# Patient Record
Sex: FEMALE | Race: WHITE | NOT HISPANIC OR LATINO | Employment: OTHER | URBAN - METROPOLITAN AREA
[De-identification: names, ages, dates, MRNs, and addresses within clinical notes are randomized per-mention and may not be internally consistent; named-entity substitution may affect disease eponyms.]

---

## 2017-10-02 ENCOUNTER — OFFICE VISIT (OUTPATIENT)
Dept: FAMILY MEDICINE | Facility: CLINIC | Age: 62
End: 2017-10-02
Payer: COMMERCIAL

## 2017-10-02 VITALS
DIASTOLIC BLOOD PRESSURE: 68 MMHG | HEIGHT: 66 IN | SYSTOLIC BLOOD PRESSURE: 118 MMHG | HEART RATE: 84 BPM | TEMPERATURE: 99 F | WEIGHT: 165.25 LBS | BODY MASS INDEX: 26.56 KG/M2

## 2017-10-02 DIAGNOSIS — S29.011A PECTORALIS MUSCLE STRAIN, INITIAL ENCOUNTER: Primary | ICD-10-CM
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
## 2017-10-02 DIAGNOSIS — R07.81 RIB PAIN ON RIGHT SIDE: ICD-10-CM

## 2017-10-02 PROCEDURE — 99213 OFFICE O/P EST LOW 20 MIN: CPT | Performed by: FAMILY MEDICINE

## 2017-10-02 RX ORDER — POLYETHYLENE GLYCOL 3350 17 G/17G
POWDER, FOR SOLUTION ORAL DAILY
COMMUNITY
End: 2024-02-12

## 2017-10-02 ASSESSMENT — PAIN SCALES - GENERAL: PAINLEVEL: MILD PAIN (3)

## 2017-10-02 NOTE — PROGRESS NOTES
SUBJECTIVE:   Deya Lee is a 62 year old female who presents to clinic today for the following health issues:      Concern - right side rib pain    Onset: 1 week ago    Description:   Right side rib pain due to a fall she had and landed on right elbow/arm that jammed into her ribs    Intensity: moderate; has worsened over the past few days     Progression of Symptoms:  same and constant    Accompanying Signs & Symptoms:  Denies any discoloration or swelling that she can see     Previous history of similar problem:   none    Precipitating factors:   Worsened by: laying down on her back is the worst; breathing deeply worsens the pain    Alleviating factors:  Improved by:     Therapies Tried and outcome: biofreeze, ibuprofen    Patient states her ribs do not hurt if she pushes on them.  She states she had an accident 20 years ago and had a hematoma, so she wonders if this is something similar.     Problem list and histories reviewed & adjusted, as indicated.  Additional history: as documented    BP Readings from Last 3 Encounters:   10/02/17 118/68   08/30/16 124/68   10/04/12 118/70    Wt Readings from Last 3 Encounters:   10/02/17 165 lb 4 oz (75 kg)   08/30/16 164 lb 6 oz (74.6 kg)   10/04/12 151 lb (68.5 kg)                  Reviewed and updated as needed this visit by clinical staff  Tobacco  Allergies  Med Hx  Surg Hx  Fam Hx  Soc Hx      Reviewed and updated as needed this visit by Provider         ROS:  Constitutional, HEENT, cardiovascular, pulmonary, GI, , neuro, skin, endocrine and psych systems are negative, except as otherwise noted.  Musculoskeletal: see above     This document serves as a record of the services and decisions personally performed and made by Chelo Carney DO. It was created on her/his behalf by Radha Manuel, a trained medical scribe. The creation of this document is based on the provider's statements to the medical scribe.  Radha Manuel October 2, 2017 11:41 AM  "      OBJECTIVE:   /68 (BP Location: Right arm, Cuff Size: Adult Large)  Pulse 84  Temp 99  F (37.2  C) (Oral)  Ht 5' 6\" (1.676 m)  Wt 165 lb 4 oz (75 kg)  BMI 26.67 kg/m2  Body mass index is 26.67 kg/(m^2).  GENERAL: healthy, alert and no distress  EYES: Eyes grossly normal to inspection, PERRL and conjunctivae and sclerae normal  HENT: ear canals and TM's normal, nose and mouth without ulcers or lesions  NECK: no adenopathy, no asymmetry, masses, or scars and thyroid normal to palpation  RESP: lungs clear to auscultation - no rales, rhonchi or wheezes  CV: regular rate and rhythm, normal S1 S2, no S3 or S4, no murmur, click or rub, no peripheral edema and peripheral pulses strong  MS: no gross musculoskeletal defects noted, no edema; pain in side with extension of shoulders, non tender through rib heads on right in posterior; pain in pec with palpation of sternum   SKIN: no suspicious lesions or rashes  NEURO: Normal strength and tone, mentation intact and speech normal  PSYCH: mentation appears normal, affect normal/bright    Diagnostic Test Results:  No results found for this or any previous visit (from the past 24 hour(s)).    ASSESSMENT/PLAN:       ICD-10-CM    1. Pectoralis muscle strain, initial encounter S29.011A    2. Rib pain on right side R07.81    3. Fall, initial encounter W19.XXXA         I recommended serratus anterior and pec stretches for patient.  Patient declines muscle relaxants.  She has no signs or symptoms of a fracture, so no x-ray was needed today.       Patient Instructions     Stretches given     This document serves as a record of the services and decisions personally performed and made by Chelo Carney DO. It was created on her/his behalf by Radha Manuel, a trained medical scribe. The creation of this document is based on the provider's statements to the medical scribe.  Radha Manuel October 2, 2017 11:41 AM     Chelo Carney DO  [facility]  "

## 2017-10-02 NOTE — MR AVS SNAPSHOT
"              After Visit Summary   10/2/2017    Deya Lee    MRN: [id number]           Patient Information     Date Of Birth          1955        Visit Information        Provider Department      10/2/2017 11:00 AM Chelo Carney DO [facility]        Today's Diagnoses     Pectoralis muscle strain, initial encounter    -  1    Rib pain on right side        Fall, initial encounter          Care Instructions    Here is a great basic stretch for Pectoralis Major    All stretches should be held for a minimum of 30 seconds. Repeat on both sides.  Stand next to a wall, extending your arm along it.   Rotate your body away.    Pec Major - Wall Stretching Position 1      Do serratus anterior muscle stretches          Follow-ups after your visit        Who to contact     If you have questions or need follow up information about today's clinic visit or your schedule please contact [facility] directly at [phone].  Normal or non-critical lab and imaging results will be communicated to you by MyChart, letter or phone within 4 business days after the clinic has received the results. If you do not hear from us within 7 days, please contact the clinic through "[employer]hart or phone. If you have a critical or abnormal lab result, we will notify you by phone as soon as possible.  Submit refill requests through [employer] or call your pharmacy and they will forward the refill request to us. Please allow 3 business days for your refill to be completed.          Additional Information About Your Visit        MyChart Information     [employer] lets you send messages to your doctor, view your test results, renew your prescriptions, schedule appointments and more. To sign up, go to [email or web address] . Click on \"Log in\" on the left side of the screen, which will take you to the Welcome page. Then click on \"Sign up Now\" on the right side of the page.     You will be asked to enter the access " "code listed below, as well as some personal information. Please follow the directions to create your username and password.     Your access code is: [email or web address]  Expires: 2017 12:00 PM     Your access code will  in 90 days. If you need help or a new code, please call your [place] clinic or [phone].        Care EveryWhere ID     This is your Care EveryWhere ID. This could be used by other organizations to access your [place] medical records  [id number]        Your Vitals Were     Pulse Temperature Height BMI (Body Mass Index)          84 99  F (37.2  C) (Oral) 5' 6\" (1.676 m) 26.67 kg/m2         Blood Pressure from Last 3 Encounters:   10/02/17 118/68   16 124/68   10/04/12 118/70    Weight from Last 3 Encounters:   10/02/17 165 lb 4 oz (75 kg)   16 164 lb 6 oz (74.6 kg)   10/04/12 151 lb (68.5 kg)              Today, you had the following     No orders found for display         Today's Medication Changes          These changes are accurate as of: 10/2/17 12:00 PM.  If you have any questions, ask your nurse or doctor.               Stop taking these medicines if you haven't already. Please contact your care team if you have questions.     PRILOSEC PO   Stopped by:  Chelo Carney,                     Primary Care Provider    None Specified       No primary provider on file.        Equal Access to Services     [facility]: Hadii eunice wetzelo Mohsen, waaxda lupatriciaadaha, qaybta kaalmada kaylen houser . So [facility] [phone].    ATENCIÓN: Si habla español, tiene a garcia disposición servicios gratuitos de asistencia lingüística. Llame al [phone].    We comply with applicable federal civil rights laws and [place] laws. We do not discriminate on the basis of race, color, national origin, age, disability, sex, sexual orientation, or gender identity.            Thank you!     Thank you for choosing [facility]  for your care. Our " goal is always to provide you with excellent care. Hearing back from our patients is one way we can continue to improve our services. Please take a few minutes to complete the written survey that you may receive in the mail after your visit with us. Thank you!             Your Updated Medication List - Protect others around you: Learn how to safely use, store and throw away your medicines at www.disposemymeds.org.          This list is accurate as of: 10/2/17 12:00 PM.  Always use your most recent med list.                   Brand Name Dispense Instructions for use Diagnosis    MIRALAX powder   Generic drug:  polyethylene glycol      Take 1 capful by mouth daily 2 tsp        ZANTAC 150 MG tablet   Generic drug:  ranitidine      Take by mouth 2 times daily        ZYRTEC ALLERGY PO      Take  by mouth.

## 2017-10-02 NOTE — PATIENT INSTRUCTIONS
Here is a great basic stretch for Pectoralis Major    All stretches should be held for a minimum of 30 seconds. Repeat on both sides.  Stand next to a wall, extending your arm along it.   Rotate your body away.    Pec Major - Wall Stretching Position 1      Do serratus anterior muscle stretches

## 2017-10-02 NOTE — NURSING NOTE
"Chief Complaint   Patient presents with     Fall     Rib Pain       Initial There were no vitals taken for this visit. Estimated body mass index is 26.53 kg/(m^2) as calculated from the following:    Height as of 8/30/16: 5' 6\" (1.676 m).    Weight as of 8/30/16: 164 lb 6 oz (74.6 kg).  Medication Reconciliation: complete   Yojana Nicholas CMA      "

## 2018-01-10 ENCOUNTER — OFFICE VISIT (OUTPATIENT)
Dept: FAMILY MEDICINE | Facility: CLINIC | Age: 63
End: 2018-01-10
Payer: COMMERCIAL

## 2018-01-10 VITALS
HEIGHT: 67 IN | WEIGHT: 166 LBS | SYSTOLIC BLOOD PRESSURE: 112 MMHG | TEMPERATURE: 97.4 F | BODY MASS INDEX: 26.06 KG/M2 | DIASTOLIC BLOOD PRESSURE: 68 MMHG | OXYGEN SATURATION: 100 % | HEART RATE: 99 BPM

## 2018-01-10 DIAGNOSIS — J01.90 ACUTE SINUSITIS WITH SYMPTOMS > 10 DAYS: ICD-10-CM

## 2018-01-10 DIAGNOSIS — R05.9 COUGH: Primary | ICD-10-CM

## 2018-01-10 DIAGNOSIS — Z12.11 COLON CANCER SCREENING: ICD-10-CM

## 2018-01-10 PROCEDURE — 99213 OFFICE O/P EST LOW 20 MIN: CPT | Performed by: FAMILY MEDICINE

## 2018-01-10 NOTE — PATIENT INSTRUCTIONS
[facility]    If you have any questions regarding to your visit please contact your care team:       Team Purple:   Clinic Hours Telephone Number   Dr. Bushra Kern   7am-7pm  Monday - Thursday   7am-5pm  Fridays  [phone]  (Appointment scheduling available 24/7)    Questions about your Visit?   Team Line:  [phone]   Urgent Care - [place] and [facility] - 11am-9pm Monday-Friday Saturday-Sunday- 9am-5pm   [place] - 5pm-9pm Monday-Friday Saturday-Sunday- 9am-5pm  [phone] - [facility]  [phone] - [place]       What options do I have for visits at the clinic other than the traditional office visit?  To expand how we care for you, many of our providers are utilizing electronic visits (e-visits) and telephone visits, when medically appropriate, for interactions with their patients rather than a visit in the clinic.   We also offer nurse visits for many medical concerns. Just like any other service, we will bill your insurance company for this type of visit based on time spent on the phone with your provider. Not all insurance companies cover these visits. Please check with your medical insurance if this type of visit is covered. You will be responsible for any charges that are not paid by your insurance.      E-visits via [employer]:  generally incur a $35.00 fee.  Telephone visits:  Time spent on the phone: *charged based on time that is spent on the phone in increments of 10 minutes. Estimated cost:   5-10 mins $30.00   11-20 mins. $59.00   21-30 mins. $85.00     Use [employer]hart (secure email communication and access to your chart) to send your primary care provider a message or make an appointment. Ask someone on your Team how to sign up for [employer].  For a Price Quote for your services, please call our Consumer Price Line at [phone].  As always, Thank you for trusting us with your health care  needs!    Analilia ROSARIO MA

## 2018-01-10 NOTE — NURSING NOTE
"Chief Complaint   Patient presents with     Sinus Problem     x 10 days     Health Maintenance     ADP, Flu shot, FIT, Hep C Screening        Initial /68  Pulse 99  Temp 97.4  F (36.3  C) (Oral)  Ht 5' 6.93\" (1.7 m)  Wt 166 lb (75.3 kg)  SpO2 100%  BMI 26.05 kg/m2 Estimated body mass index is 26.05 kg/(m^2) as calculated from the following:    Height as of this encounter: 5' 6.93\" (1.7 m).    Weight as of this encounter: 166 lb (75.3 kg).  Medication Reconciliation: complete   Analilia ROSARIO MA      "

## 2018-01-10 NOTE — MR AVS SNAPSHOT
After Visit Summary   1/10/2018    Deya Lee    MRN: [id number]           Patient Information     Date Of Birth          1955        Visit Information        Provider Department      1/10/2018 2:40 PM Rodger Richards MD [facility]        Today's Diagnoses     Cough    -  1    Acute sinusitis with symptoms > 10 days        Colon cancer screening          Care Instructions    [facility]    If you have any questions regarding to your visit please contact your care team:       Team Purple:   Clinic Hours Telephone Number   Dr. Bushra Kern   7am-7pm  Monday - Thursday   7am-5pm  Fridays  [phone]  (Appointment scheduling available 24/7)    Questions about your Visit?   Team Line:  [phone]   Urgent Care - [place] and [facility] - 11am-9pm Monday-Friday Saturday-Sunday- 9am-5pm   [place] - 5pm-9pm Monday-Friday Saturday-Sunday- 9am-5pm  [phone] - [facility]  [phone] - [employer]       What options do I have for visits at the clinic other than the traditional office visit?  To expand how we care for you, many of our providers are utilizing electronic visits (e-visits) and telephone visits, when medically appropriate, for interactions with their patients rather than a visit in the clinic.   We also offer nurse visits for many medical concerns. Just like any other service, we will bill your insurance company for this type of visit based on time spent on the phone with your provider. Not all insurance companies cover these visits. Please check with your medical insurance if this type of visit is covered. You will be responsible for any charges that are not paid by your insurance.      E-visits via [employer]:  generally incur a $35.00 fee.  Telephone visits:  Time spent on the phone: *charged based on time that is spent on the phone in increments of 10 minutes.  "Estimated cost:   5-10 mins $30.00   11-20 mins. $59.00   21-30 mins. $85.00     Use [employer]hart (secure email communication and access to your chart) to send your primary care provider a message or make an appointment. Ask someone on your Team how to sign up for [employer].  For a Price Quote for your services, please call our [employer] Line at [phone].  As always, Thank you for trusting us with your health care needs!    Analilia ROSARIO MA            Follow-ups after your visit        Future tests that were ordered for you today     Open Future Orders        Priority Expected Expires Ordered    Fecal colorectal cancer screen (FIT) Routine 1/31/2018 4/4/2018 1/10/2018            Who to contact     If you have questions or need follow up information about today's clinic visit or your schedule please contact [facility] directly at [phone].  Normal or non-critical lab and imaging results will be communicated to you by [employer], letter or phone within 4 business days after the clinic has received the results. If you do not hear from us within 7 days, please contact the clinic through [employer] or phone. If you have a critical or abnormal lab result, we will notify you by phone as soon as possible.  Submit refill requests through [employer] or call your pharmacy and they will forward the refill request to us. Please allow 3 business days for your refill to be completed.          Additional Information About Your Visit        Off[employer]atic Information     [employer] lets you send messages to your doctor, view your test results, renew your prescriptions, schedule appointments and more. To sign up, go to [email or web address] . Click on \"Log in\" on the left side of the screen, which will take you to the Welcome page. Then click on \"Sign up Now\" on the right side of the page.     You will be asked to enter the access code listed below, as well as some personal information. Please follow the directions to create your " "username and password.     Your access code is: [email or web address]  Expires: 4/10/2018  3:05 PM     Your access code will  in 90 days. If you need help or a new code, please call your [place] clinic or [phone].        Care EveryWhere ID     This is your Care EveryWhere ID. This could be used by other organizations to access your [place] medical records  [id number]        Your Vitals Were     Pulse Temperature Height Pulse Oximetry BMI (Body Mass Index)       99 97.4  F (36.3  C) (Oral) 5' 6.93\" (1.7 m) 100% 26.05 kg/m2        Blood Pressure from Last 3 Encounters:   01/10/18 112/68   10/02/17 118/68   16 124/68    Weight from Last 3 Encounters:   01/10/18 166 lb (75.3 kg)   10/02/17 165 lb 4 oz (75 kg)   16 164 lb 6 oz (74.6 kg)                 Today's Medication Changes          These changes are accurate as of: 1/10/18  3:05 PM.  If you have any questions, ask your nurse or doctor.               Start taking these medicines.        Dose/Directions    amoxicillin-clavulanate 875-125 MG per tablet   Commonly known as:  AUGMENTIN   Used for:  Acute sinusitis with symptoms > 10 days   Started by:  Rodger Richards MD        Dose:  1 tablet   Take 1 tablet by mouth 2 times daily   Quantity:  20 tablet   Refills:  0            Where to get your medicines      These medications were sent to [facility]  [address] [place] [address]  [address] [facility] [address], [place] [address]     Phone:  [phone]     amoxicillin-clavulanate 875-125 MG per tablet                Primary Care Provider Fax #    Physician No Ref-Primary [phone]       No address on file        Equal Access to Services     [facility]: Tanmay Connolly, waaxda luqadaha, qaybta kaalmada corrina, kaylen david [facility] [phone].    ATENCIÓN: Si habla español, tiene a garcia disposición servicios gratuitos de asistencia lingüística. Llame al " [phone].    We comply with applicable federal civil rights laws and [place] laws. We do not discriminate on the basis of race, color, national origin, age, disability, sex, sexual orientation, or gender identity.            Thank you!     Thank you for choosing [facility]  for your care. Our goal is always to provide you with excellent care. Hearing back from our patients is one way we can continue to improve our services. Please take a few minutes to complete the written survey that you may receive in the mail after your visit with us. Thank you!             Your Updated Medication List - Protect others around you: Learn how to safely use, store and throw away your medicines at www.disposemymeds.org.          This list is accurate as of: 1/10/18  3:05 PM.  Always use your most recent med list.                   Brand Name Dispense Instructions for use Diagnosis    amoxicillin-clavulanate 875-125 MG per tablet    AUGMENTIN    20 tablet    Take 1 tablet by mouth 2 times daily    Acute sinusitis with symptoms > 10 days       MIRALAX powder   Generic drug:  polyethylene glycol      Take 1 capful by mouth daily 2 tsp        ZANTAC 150 MG tablet   Generic drug:  ranitidine      Take by mouth 2 times daily        ZYRTEC ALLERGY PO      Take  by mouth.

## 2018-01-10 NOTE — PROGRESS NOTES
"  SUBJECTIVE:   Deya Lee is a 62 year old female who presents to clinic today for the following health issues:    RESPIRATORY SYMPTOMS      Duration: 10 days     Description  nasal congestion, sore throat, facial pain/pressure, cough, headache, fatigue/malaise and hoarse voice    Severity: high moderate    Accompanying signs and symptoms: None    History (predisposing factors):  Sinus infections     Precipitating or alleviating factors: ibuprofen helped a little bit   Therapies tried and outcome:  Ibuprofen    Cough, facial pressure, no dental pain  PROBLEMS TO ADD ON...    Problem list and histories reviewed & adjusted, as indicated.  Additional history: as documented    Patient Active Problem List   Diagnosis     Advanced directives, counseling/discussion     Seasonal allergies     Personal history of kidney stones     Primary osteoarthritis of both knees     Past Surgical History:   Procedure Laterality Date     BUNIONECTOMY      right foot     C/SECTION, CLASSICAL      2 times     FUSION CERVICAL ANTERIOR ONE LEVEL      c4-5     SINUS SURGERY      deviated septum correction     TONSILLECTOMY & ADENOIDECTOMY         Social History   Substance Use Topics     Smoking status: Never Smoker     Smokeless tobacco: Never Used     Alcohol use No     Family History   Problem Relation Age of Onset     HEART DISEASE Father      MI in his 70's     CANCER Father      had kidney removed due to spot      DIABETES Paternal Grandmother      DIABETES Paternal Grandfather      Alzheimer Disease Sister 52     C.A.D. No family hx of      Breast Cancer No family hx of      Colon Cancer No family hx of          Reviewed and updated as needed this visit by clinical staff    ROS:  Constitutional, cardiovascular, gi and gu systems are negative, except as otherwise noted.    OBJECTIVE:   /68  Pulse 99  Temp 97.4  F (36.3  C) (Oral)  Ht 5' 6.93\" (1.7 m)  Wt 166 lb (75.3 kg)  SpO2 100%  BMI 26.05 kg/m2  Body mass index " is 26.05 kg/(m^2).  GENERAL: healthy, alert and no distress  HENT: Nasal congestion, maxillary sinus tenderness bilaterally  NECK: no adenopathy and thyroid normal to palpation  RESP: lungs clear to auscultation - no rales, rhonchi or wheezes  CV: regular rate and rhythm, no murmur, click or rub, no peripheral edema   MS: no gross musculoskeletal defects noted, no edema    Diagnostic Test Results:  none     ASSESSMENT/PLAN:     (R05) Cough  (primary encounter diagnosis)  Comment: Viral, PND from sinus infection  Plan: Symptomatic treatment    (J01.90) Acute sinusitis with symptoms > 10 days  Comment: Discussed the nature and pathophysiology of sinusitis and treatment including the role of antibiotics and the need to get over the underlying trigger, URI or allergies.  Plan: amoxicillin-clavulanate (AUGMENTIN) 875-125 MG         per tablet    (Z12.11) Colon cancer screening  Comment: Would like to recheck with FIT  Plan: Fecal colorectal cancer screen (FIT)    Call or return to clinic prn if these symptoms worsen or fail to improve as anticipated in 2 weeks.    Rodger Richards MD  [facility]

## 2018-02-22 ENCOUNTER — OFFICE VISIT (OUTPATIENT)
Dept: FAMILY MEDICINE | Facility: CLINIC | Age: 63
End: 2018-02-22
Payer: COMMERCIAL

## 2018-02-22 VITALS
WEIGHT: 162 LBS | DIASTOLIC BLOOD PRESSURE: 68 MMHG | HEIGHT: 67 IN | TEMPERATURE: 98.6 F | BODY MASS INDEX: 25.43 KG/M2 | HEART RATE: 68 BPM | SYSTOLIC BLOOD PRESSURE: 114 MMHG

## 2018-02-22 DIAGNOSIS — M67.431 GANGLION CYST OF WRIST, RIGHT: Primary | ICD-10-CM

## 2018-02-22 DIAGNOSIS — Z12.11 SCREEN FOR COLON CANCER: ICD-10-CM

## 2018-02-22 PROCEDURE — 99213 OFFICE O/P EST LOW 20 MIN: CPT | Performed by: FAMILY MEDICINE

## 2018-02-22 NOTE — MR AVS SNAPSHOT
After Visit Summary   2/22/2018    Deya Lee    MRN: [id number]           Patient Information     Date Of Birth          1955        Visit Information        Provider Department      2/22/2018 10:00 AM Rosa Pena MD [facility]        Today's Diagnoses     Ganglion cyst of wrist, right    -  1    Screen for colon cancer          Care Instructions      Ganglion Cyst: Hand    A ganglion cyst is a firm, fluid-filled lump that can suddenly appear on the front or back of the wrist or at the base of a finger. These cysts grow from normal tissue in the wrist and fingers, and range in size from a pea to a peach pit. Although ganglion cysts are common, they don t spread, and they don t become cancerous. They can occur after an injury, but many times it isn t known why they grow. Ganglion cysts can change in size, and may go away on their own.  Symptoms  A ganglion cyst is sometimes painful, especially when it first occurs. Constantly using your hand or wrist can make the cyst enlarge and hurt more. Some hand and wrist movements, such as grasping things, may also be difficult.  How a ganglion cyst develops  Your wrist and hand are made up of many small bones that meet at joints. Tendons attach muscles to the bones at the joints. The tendons allow the joints to bend and straighten. Both tendons and joints are lined with tissue called synovium. This tissue makes a thick fluid that keeps the joints and tendons moving easily. Sometimes the tissue balloons out from the joint or tendons and forms a cyst. As the cyst fills with fluid and grows, it appears as a lump you can feel.  Where ganglion cysts occur  A ganglion cyst can occur anywhere on the hand near a joint. Cysts most commonly appear on the back or palm side of the wrist, or on the palm at the base of a finger. Your doctor can usually diagnose a cyst by examining the lump. He or she may draw off a little fluid or  order an X-ray to rule out other problems.  Treating a ganglion cyst  Your healthcare provider may just watch your ganglion cyst. Many shrink and become painless without treatment. Some disappear altogether. If the cyst is unsightly or painful, or makes it hard for you to use your hand, your healthcare provider can treat it or, if needed, remove it surgically.  Nonsurgical treatment  To shrink the cyst, your provider may remove (aspirate) the fluid with a needle. If the cyst hurts, your provider may also give you an injection of an anti-inflammatory, such as cortisone, to relieve the irritation. Your hand may then be wrapped to help keep the cyst from recurring.  Surgery  If the cyst reappears after treatment, your healthcare provider may remove it surgically. A section of the tissue that lines the joint or tendon is removed along with the cyst. This helps prevent another cyst from forming, although recurrence of the cyst is still possible after surgery. Usually, only your hand or arm is numbed, and you can go home a few hours after surgery. Your hand may be in a splint for several days.  Date Last Reviewed: 9/10/2015    1492-7272 [employer]. [address] [place] [address]. All rights reserved. This information is not intended as a substitute for professional medical care. Always follow your healthcare professional's instructions.      [facility]   Discharged by : Ana Vogt MA    Paper scripts provided to patient : no     If you have any questions regarding your visit please contact your care team:     Team [facility] Hours Telephone Number     Dr. Kimberly Pena 7am-7pm  Monday - Thursday   7am-5pm  Fridays  [phone]   (Appointment scheduling available 24/7)     RN Line  [phone] option 2     Urgent Care - [place] and [facility] - 11am-9pm  Monday-Friday Saturday-Sunday- 9am-5pm     [place] -   5pm-9pm Monday-Friday Saturday-Sunday- 9am-5pm    [phone] - [place]    [phone] - [place]       For a Price Quote for your services, please call our Consumer Price Line at [phone].     What options do I have for visits at the clinic other than the traditional office visit?     To expand how we care for you, many of our providers are utilizing electronic visits (e-visits) and telephone visits, when medically appropriate, for interactions with their patients rather than a visit in the clinic. We also offer nurse visits for many medical concerns. Just like any other service, we will bill your insurance company for this type of visit based on time spent on the phone with your provider. Not all insurance companies cover these visits. Please check with your medical insurance if this type of visit is covered. You will be responsible for any charges that are not paid by your insurance.   E-visits via [employer]: generally incur a $35.00 fee.     Telephone visits:   Time spent on the phone: *charged based on time that is spent on the phone in increments of 10 minutes. Estimated cost:   5-10 mins $30.00   11-20 mins. $59.00   21-30 mins. $85.00     Use Azumiot (secure email communication and access to your chart) to send your primary care provider a message or make an appointment. Ask someone on your Team how to sign up for [employer].     As always, Thank you for trusting us with your health care needs!      [facility]:    Scheduling Appointments  [facility]  Call: [phone]    [place] [facility] [facility]  Call: [phone]    [facility]  Call: [phone]    For Gastroenterology referrals   [facility]   Clinics and Surgery [place], 4th Floor   [address] [place] [address]   Appointments: [phone]    WHERE TO GO FOR CARE?  Clinic    Make  an appointment if you:       Are sick (cold, cough, flu, sore throat, earache or in pain).       Have a small injury (sprain, small cut, burn or broken bone).       Need a physical exam, Pap smear, vaccine or prescription refill.       Have questions about your health or medicines.    To reach us:      Call [phone] ([phone]). Open 24 hours every day. (For counseling services, call [phone].)    Log into Follicum at Jipio. (Visit GLSS to create an account.) Hospital emergency room    An emergency is a serious or life- threatening problem that must be treated right away.    Call 434 or get to the hospital if you have:      Very bad or sudden:            - Chest pain or pressure         - Bleeding         - Head or belly pain         - Dizziness or trouble seeing, walking or                          Speaking      Problems breathing      Blood in your vomit or you are coughing up blood      A major injury (knocked out, loss of a finger or limb, rape, broken bone protruding from skin)    A mental health crisis. (Or call the Mental Health Crisis line at 1-253.786.9526 or Suicide Prevention Hotline at 1-793.139.2650.)    Open 24 hours every day. You don't need an appointment.     Urgent care    Visit urgent care for sickness or small injuries when the clinic is closed. You don't need an appointment. To check hours or find an urgent care near you, visit [email or web address]. Online care    Get online care from [facility] for more than 70 common problems, like colds, allergies and infections. Open 24 hours every day at:   [email or web address]   Need help deciding?    For advice about where to be seen, you may call your clinic and ask to speak with a nurse. We're here for you 24 hours every day.         If you are deaf or hard of hearing, please let us know. We provide many free services including sign language interpreters, oral interpreters, TTYs, telephone amplifiers, note takers and  "written materials.                   Follow-ups after your visit        Who to contact     If you have questions or need follow up information about today's clinic visit or your schedule please contact [facility] directly at [phone].  Normal or non-critical lab and imaging results will be communicated to you by MyChart, letter or phone within 4 business days after the clinic has received the results. If you do not hear from us within 7 days, please contact the clinic through MyChart or phone. If you have a critical or abnormal lab result, we will notify you by phone as soon as possible.  Submit refill requests through goBalto or call your pharmacy and they will forward the refill request to us. Please allow 3 business days for your refill to be completed.          Additional Information About Your Visit        [employer] Information     goBalto lets you send messages to your doctor, view your test results, renew your prescriptions, schedule appointments and more. To sign up, go to [email or web address] . Click on \"Log in\" on the left side of the screen, which will take you to the Welcome page. Then click on \"Sign up Now\" on the right side of the page.     You will be asked to enter the access code listed below, as well as some personal information. Please follow the directions to create your username and password.     Your access code is: [email or web address]  Expires: 4/10/2018  3:05 PM     Your access code will  in 90 days. If you need help or a new code, please call your [facility] clinic or [phone].        Care EveryWhere ID     This is your Care EveryWhere ID. This could be used by other organizations to access your [facility] medical records  [id number]        Your Vitals Were     Pulse Temperature Height BMI (Body Mass Index)          68 98.6  F (37  C) (Oral) 5' 6.93\" (1.7 m) 25.43 kg/m2         Blood Pressure from Last 3 Encounters:   18 114/68   01/10/18 112/68   10/02/17 " 118/68    Weight from Last 3 Encounters:   02/22/18 162 lb (73.5 kg)   01/10/18 166 lb (75.3 kg)   10/02/17 165 lb 4 oz (75 kg)              Today, you had the following     No orders found for display         Today's Medication Changes          These changes are accurate as of 2/22/18 10:41 AM.  If you have any questions, ask your nurse or doctor.               Stop taking these medicines if you haven't already. Please contact your care team if you have questions.     amoxicillin-clavulanate 875-125 MG per tablet   Commonly known as:  AUGMENTIN   Stopped by:  Rosa Pena MD                    Primary Care Provider Fax #    Physician No Ref-Primary [phone]       No address on file        Equal Access to Services     MANUELA NANCE : Tanmay Connolly, warigoberto luqshaka, qaybdior kaalmarobbi houser, kaylen mckenzie . So [facility] [phone].    ATENCIÓN: Si habla español, tiene a garcia disposición servicios gratuitos de asistencia lingüística. Llame al [phone].    We comply with applicable federal civil rights laws and [place] laws. We do not discriminate on the basis of race, color, national origin, age, disability, sex, sexual orientation, or gender identity.            Thank you!     Thank you for choosing [facility]  for your care. Our goal is always to provide you with excellent care. Hearing back from our patients is one way we can continue to improve our services. Please take a few minutes to complete the written survey that you may receive in the mail after your visit with us. Thank you!             Your Updated Medication List - Protect others around you: Learn how to safely use, store and throw away your medicines at www.disposemymeds.org.          This list is accurate as of 2/22/18 10:41 AM.  Always use your most recent med list.                   Brand Name Dispense Instructions for use Diagnosis    MIRALAX powder   Generic drug:   polyethylene glycol      Take 1 capful by mouth daily 2 tsp        ZANTAC 150 MG tablet   Generic drug:  ranitidine      Take by mouth 2 times daily        ZYRTEC ALLERGY PO      Take  by mouth.

## 2018-02-22 NOTE — NURSING NOTE
"Chief Complaint   Patient presents with     Cyst     wrist, right       Initial /68  Pulse 68  Temp 98.6  F (37  C) (Oral)  Ht 5' 6.93\" (1.7 m)  Wt 162 lb (73.5 kg)  BMI 25.43 kg/m2 Estimated body mass index is 25.43 kg/(m^2) as calculated from the following:    Height as of this encounter: 5' 6.93\" (1.7 m).    Weight as of this encounter: 162 lb (73.5 kg).  Medication Reconciliation: complete   Ana Vogt MA      "

## 2018-02-22 NOTE — PATIENT INSTRUCTIONS
Ganglion Cyst: Hand    A ganglion cyst is a firm, fluid-filled lump that can suddenly appear on the front or back of the wrist or at the base of a finger. These cysts grow from normal tissue in the wrist and fingers, and range in size from a pea to a peach pit. Although ganglion cysts are common, they don t spread, and they don t become cancerous. They can occur after an injury, but many times it isn t known why they grow. Ganglion cysts can change in size, and may go away on their own.  Symptoms  A ganglion cyst is sometimes painful, especially when it first occurs. Constantly using your hand or wrist can make the cyst enlarge and hurt more. Some hand and wrist movements, such as grasping things, may also be difficult.  How a ganglion cyst develops  Your wrist and hand are made up of many small bones that meet at joints. Tendons attach muscles to the bones at the joints. The tendons allow the joints to bend and straighten. Both tendons and joints are lined with tissue called synovium. This tissue makes a thick fluid that keeps the joints and tendons moving easily. Sometimes the tissue balloons out from the joint or tendons and forms a cyst. As the cyst fills with fluid and grows, it appears as a lump you can feel.  Where ganglion cysts occur  A ganglion cyst can occur anywhere on the hand near a joint. Cysts most commonly appear on the back or palm side of the wrist, or on the palm at the base of a finger. Your doctor can usually diagnose a cyst by examining the lump. He or she may draw off a little fluid or order an X-ray to rule out other problems.  Treating a ganglion cyst  Your healthcare provider may just watch your ganglion cyst. Many shrink and become painless without treatment. Some disappear altogether. If the cyst is unsightly or painful, or makes it hard for you to use your hand, your healthcare provider can treat it or, if needed, remove it surgically.  Nonsurgical treatment  To shrink the cyst, your  provider may remove (aspirate) the fluid with a needle. If the cyst hurts, your provider may also give you an injection of an anti-inflammatory, such as cortisone, to relieve the irritation. Your hand may then be wrapped to help keep the cyst from recurring.  Surgery  If the cyst reappears after treatment, your healthcare provider may remove it surgically. A section of the tissue that lines the joint or tendon is removed along with the cyst. This helps prevent another cyst from forming, although recurrence of the cyst is still possible after surgery. Usually, only your hand or arm is numbed, and you can go home a few hours after surgery. Your hand may be in a splint for several days.  Date Last Reviewed: 9/10/2015    7770-9997 [employer]. [address] [place] [address], [place] [address]. All rights reserved. This information is not intended as a substitute for professional medical care. Always follow your healthcare professional's instructions.      [facility]   Discharged by : Ana Vogt MA    Paper scripts provided to patient : no     If you have any questions regarding your visit please contact your care team:     Team Gold                [facility] Hours Telephone Number     Dr. Kimberly Pena 7am-7pm  Monday - Thursday   7am-5pm  Fridays  [phone]   (Appointment scheduling available 24/7)     RN Line  [phone] option 2     Urgent Care - [place] - 11am-9pm Monday-Friday Saturday-Sunday- 9am-5pm     [place] -   5pm-9pm Monday-Friday Saturday-Sunday- 9am-5pm    [phone] - [place]    [phone] - [place]       For a Price Quote for your services, please call our Consumer Price Line at [phone].     What options do I have for visits at the clinic other than the traditional office visit?     To expand how we care for you, many of our providers  are utilizing electronic visits (e-visits) and telephone visits, when medically appropriate, for interactions with their patients rather than a visit in the clinic. We also offer nurse visits for many medical concerns. Just like any other service, we will bill your insurance company for this type of visit based on time spent on the phone with your provider. Not all insurance companies cover these visits. Please check with your medical insurance if this type of visit is covered. You will be responsible for any charges that are not paid by your insurance.   E-visits via [employer]: generally incur a $35.00 fee.     Telephone visits:   Time spent on the phone: *charged based on time that is spent on the phone in increments of 10 minutes. Estimated cost:   5-10 mins $30.00   11-20 mins. $59.00   21-30 mins. $85.00     Use Ule (secure email communication and access to your chart) to send your primary care provider a message or make an appointment. Ask someone on your Team how to sign up for Ule.     As always, Thank you for trusting us with your health care needs!      [facility]:    Scheduling Appointments  [facility]  Call: [phone]    [facility]  Call: [phone]    [facility]  Call: [phone]    For Gastroenterology referrals   [facility] Gastroenterology   Clinics and Surgery Center, 4th Floor   [address] [place] [address]   Appointments: [phone]    WHERE TO GO FOR CARE?  Clinic    Make an appointment if you:       Are sick (cold, cough, flu, sore throat, earache or in pain).       Have a small injury (sprain, small cut, burn or broken bone).       Need a physical exam, Pap smear, vaccine or prescription refill.       Have questions about your health or medicines.    To reach us:      Call [phone] ([phone]). Open 24 hours every day. (For counseling services, call  [phone].)    Log into [employer] at [email or web address]. (Visit [email or web address] to create an account.) Hospital emergency room    An emergency is a serious or life- threatening problem that must be treated right away.    Call 911 or get to the hospital if you have:      Very bad or sudden:            - Chest pain or pressure         - Bleeding         - Head or belly pain         - Dizziness or trouble seeing, walking or                          Speaking      Problems breathing      Blood in your vomit or you are coughing up blood      A major injury (knocked out, loss of a finger or limb, rape, broken bone protruding from skin)    A mental health crisis. (Or call the Mental Health Crisis line at 1-427.956.4439 or Suicide Prevention Hotline at 1-468.707.5967.)    Open 24 hours every day. You don't need an appointment.     Urgent care    Visit urgent care for sickness or small injuries when the clinic is closed. You don't need an appointment. To check hours or find an urgent care near you, visit [email or web address]. Online care    Get online care from [facility] for more than 70 common problems, like colds, allergies and infections. Open 24 hours every day at:   [email or web address]   Need help deciding?    For advice about where to be seen, you may call your clinic and ask to speak with a nurse. We're here for you 24 hours every day.         If you are deaf or hard of hearing, please let us know. We provide many free services including sign language interpreters, oral interpreters, TTYs, telephone amplifiers, note takers and written materials.

## 2018-02-22 NOTE — PROGRESS NOTES
SUBJECTIVE:   Deya Lee is a 62 year old female who presents to clinic today for the following health issues:       Patient here with cyst on right wrist.   Noticed a couple weeks ago. Sudden onset, gets sore at times.   No changes in size . No trauma. She plays piano.     Problem list and histories reviewed & adjusted, as indicated.  Additional history: as documented    Patient Active Problem List   Diagnosis     Advanced directives, counseling/discussion     Seasonal allergies     Personal history of kidney stones     Primary osteoarthritis of both knees     Past Surgical History:   Procedure Laterality Date     BUNIONECTOMY      right foot     C/SECTION, CLASSICAL      2 times     FUSION CERVICAL ANTERIOR ONE LEVEL      c4-5     SINUS SURGERY      deviated septum correction     TONSILLECTOMY & ADENOIDECTOMY         Social History   Substance Use Topics     Smoking status: Never Smoker     Smokeless tobacco: Never Used     Alcohol use No     Family History   Problem Relation Age of Onset     HEART DISEASE Father      MI in his 70's     CANCER Father      had kidney removed due to spot      DIABETES Paternal Grandmother      DIABETES Paternal Grandfather      Alzheimer Disease Sister 52     C.A.D. No family hx of      Breast Cancer No family hx of      Colon Cancer No family hx of          Current Outpatient Prescriptions   Medication Sig Dispense Refill     ranitidine (ZANTAC) 150 MG tablet Take by mouth 2 times daily       polyethylene glycol (MIRALAX) powder Take 1 capful by mouth daily 2 tsp       Cetirizine HCl (ZYRTEC ALLERGY PO) Take  by mouth.         Allergies   Allergen Reactions     Codeine Phosphate Nausea     Erythromycin Nausea     Recent Labs   Lab Test  08/30/16   0757  10/11/12   0932  07/11/12   0629   LDL  175*  184*   --    HDL  37*  34*   --    TRIG  144  120   --    CR   --    --   0.89   GFRESTIMATED   --    --   65   GFRESTBLACK   --    --   79   POTASSIUM   --    --   3.8   TSH    "--   0.50   --       BP Readings from Last 3 Encounters:   02/22/18 114/68   01/10/18 112/68   10/02/17 118/68    Wt Readings from Last 3 Encounters:   02/22/18 162 lb (73.5 kg)   01/10/18 166 lb (75.3 kg)   10/02/17 165 lb 4 oz (75 kg)                  Labs reviewed in EPIC    Reviewed and updated as needed this visit by clinical staff  Allergies       Reviewed and updated as needed this visit by Provider         ROS:  Constitutional, HEENT, cardiovascular, pulmonary, gi and gu systems are negative, except as otherwise noted.    OBJECTIVE:     /68  Pulse 68  Temp 98.6  F (37  C) (Oral)  Ht 5' 6.93\" (1.7 m)  Wt 162 lb (73.5 kg)  BMI 25.43 kg/m2  Body mass index is 25.43 kg/(m^2).  GENERAL: healthy, alert and no distress  Right wrist, approx 3 x 3 mm, cystic , nontender lump.    ASSESSMENT/PLAN:       ICD-10-CM    1. Ganglion cyst of wrist, right M67.431    2. Screen for colon cancer Z12.11      Benign nature of ganglion cyst discussed. Printed information provided to pt.     Pt has the FIT test kit, will submit the test soon.     Rosa Pena MD  [facility]    "

## 2018-02-28 PROCEDURE — 82274 ASSAY TEST FOR BLOOD FECAL: CPT | Performed by: FAMILY MEDICINE

## 2018-03-01 DIAGNOSIS — Z12.11 COLON CANCER SCREENING: ICD-10-CM

## 2018-03-01 LAB — HEMOCCULT STL QL IA: NEGATIVE

## 2018-06-28 ENCOUNTER — OFFICE VISIT (OUTPATIENT)
Dept: FAMILY MEDICINE | Facility: CLINIC | Age: 63
End: 2018-06-28
Payer: COMMERCIAL

## 2018-06-28 VITALS
BODY MASS INDEX: 25.74 KG/M2 | OXYGEN SATURATION: 99 % | HEART RATE: 97 BPM | DIASTOLIC BLOOD PRESSURE: 72 MMHG | TEMPERATURE: 98.9 F | SYSTOLIC BLOOD PRESSURE: 118 MMHG | WEIGHT: 164 LBS

## 2018-06-28 DIAGNOSIS — R00.0 TACHYCARDIA: Primary | ICD-10-CM

## 2018-06-28 DIAGNOSIS — M25.542 ARTHRALGIA OF BOTH HANDS: ICD-10-CM

## 2018-06-28 DIAGNOSIS — R00.0 RAPID HEARTBEAT: Primary | ICD-10-CM

## 2018-06-28 DIAGNOSIS — F43.9 STRESS: ICD-10-CM

## 2018-06-28 DIAGNOSIS — M25.541 ARTHRALGIA OF BOTH HANDS: ICD-10-CM

## 2018-06-28 LAB — ERYTHROCYTE [SEDIMENTATION RATE] IN BLOOD BY WESTERGREN METHOD: 16 MM/H (ref 0–30)

## 2018-06-28 PROCEDURE — 99214 OFFICE O/P EST MOD 30 MIN: CPT | Performed by: NURSE PRACTITIONER

## 2018-06-28 PROCEDURE — 86431 RHEUMATOID FACTOR QUANT: CPT | Performed by: NURSE PRACTITIONER

## 2018-06-28 PROCEDURE — 85652 RBC SED RATE AUTOMATED: CPT | Performed by: NURSE PRACTITIONER

## 2018-06-28 PROCEDURE — 93000 ELECTROCARDIOGRAM COMPLETE: CPT | Performed by: NURSE PRACTITIONER

## 2018-06-28 ASSESSMENT — PAIN SCALES - GENERAL: PAINLEVEL: NO PAIN (0)

## 2018-06-28 NOTE — PATIENT INSTRUCTIONS
If your develop symptoms along with pounding heart, or it does not resolve despite quitting job and reduction in stress, please let me know and I will order portable heart rate monitor for you

## 2018-06-28 NOTE — MR AVS SNAPSHOT
"              After Visit Summary   6/28/2018    Deya Lee    MRN: [id number]           Patient Information     Date Of Birth          1955        Visit Information        Provider Department      6/28/2018 10:20 AM Emily Escobar APRN CNP [facility]        Today's Diagnoses     Rapid heartbeat    -  1    Stress        Arthralgia of both hands          Care Instructions    If your develop symptoms along with pounding heart, or it does not resolve despite quitting job and reduction in stress, please let me know and I will order portable heart rate monitor for you          Follow-ups after your visit        Who to contact     If you have questions or need follow up information about today's clinic visit or your schedule please contact [facility] directly at [phone].  Normal or non-critical lab and imaging results will be communicated to you by MyChart, letter or phone within 4 business days after the clinic has received the results. If you do not hear from us within 7 days, please contact the clinic through MyChart or phone. If you have a critical or abnormal lab result, we will notify you by phone as soon as possible.  Submit refill requests through [employer] or call your pharmacy and they will forward the refill request to us. Please allow 3 business days for your refill to be completed.          Additional Information About Your Visit        MyChart Information     [employer] lets you send messages to your doctor, view your test results, renew your prescriptions, schedule appointments and more. To sign up, go to [email or web address] . Click on \"Log in\" on the left side of the screen, which will take you to the Welcome page. Then click on \"Sign up Now\" on the right side of the page.     You will be asked to enter the access code listed below, as well as some personal information. Please follow the directions to create your username and " password.     Your access code is: [email or web address]  Expires: 2018 10:51 AM     Your access code will  in 90 days. If you need help or a new code, please call your [place] [place] clinic or [phone].        Care EveryWhere ID     This is your Care EveryWhere ID. This could be used by other organizations to access your [facility] medical records  [id number]        Your Vitals Were     Pulse Temperature Pulse Oximetry Breastfeeding? BMI (Body Mass Index)       97 98.9  F (37.2  C) (Oral) 99% No 25.74 kg/m2        Blood Pressure from Last 3 Encounters:   18 118/72   18 114/68   01/10/18 112/68    Weight from Last 3 Encounters:   18 164 lb (74.4 kg)   18 162 lb (73.5 kg)   01/10/18 166 lb (75.3 kg)              We Performed the Following     Anaplasma phagocytoph antibody IgG IgM     EKG 12-lead, tracing only     Erythrocyte sedimentation rate auto     Rheumatoid factor          Today's Medication Changes          These changes are accurate as of 18 10:52 AM.  If you have any questions, ask your nurse or doctor.               Start taking these medicines.        Dose/Directions    diclofenac 1 % Gel topical gel   Commonly known as:  VOLTAREN   Used for:  Arthralgia of both hands   Started by:  Emily Escobar APRN CNP        Apply  2 grams to hands four times daily using enclosed dosing card.   Quantity:  100 g   Refills:  1            Where to get your medicines      These medications were sent to [id number] - [place], [place] - [address]  [address], [place] [address]     Phone:  [phone]     diclofenac 1 % Gel topical gel                Primary Care Provider Fax #    Physician No Ref-Primary [phone]       No address on file        Equal Access to Services     MANUELA NANCE AH: Hadii aad ku hadasho Soomaali, waaxda luqadaha, qaybta kaalmakaylen vasquez  ah. So [facility] [phone].    ATENCIÓN: Si avilala jacquelyn, tiene a garcia disposición servicios gratuitos de asistencia lingüística. Sonia al [phone].    We comply with applicable federal civil rights laws and [place] laws. We do not discriminate on the basis of race, color, national origin, age, disability, sex, sexual orientation, or gender identity.            Thank you!     Thank you for choosing [facility]  for your care. Our goal is always to provide you with excellent care. Hearing back from our patients is one way we can continue to improve our services. Please take a few minutes to complete the written survey that you may receive in the mail after your visit with us. Thank you!             Your Updated Medication List - Protect others around you: Learn how to safely use, store and throw away your medicines at www.disposemymeds.org.          This list is accurate as of 6/28/18 10:52 AM.  Always use your most recent med list.                   Brand Name Dispense Instructions for use Diagnosis    diclofenac 1 % Gel topical gel    VOLTAREN    100 g    Apply  2 grams to hands four times daily using enclosed dosing card.    Arthralgia of both hands       MIRALAX powder   Generic drug:  polyethylene glycol      Take 1 capful by mouth daily 2 tsp        ZANTAC 150 MG tablet   Generic drug:  ranitidine      Take by mouth 2 times daily        ZYRTEC ALLERGY PO      Take  by mouth.

## 2018-06-28 NOTE — PROGRESS NOTES
"  SUBJECTIVE:   Deya Lee is a 63 year old female who presents to clinic today for the following health issues:      Concern - Heart racing  Onset: 4 days    Description:   Feels like her heart is racing.    Intensity: mild    Progression of Symptoms: same    Accompanying Signs & Symptoms:  none    Previous history of similar problem:   no    Precipitating factors:   Worsened by: Stress, stressful issue at work    Alleviating factors:  Improved by:     Therapies Tried and outcome:     Sensation of \"pounding\" HR intermittently for 4 days  Occurs at work  She has been working in  room at  which is causing a lot of stress  Not worsened with exertion  Denies lightheadedness, nausea, pre-scncope    Also complains of bilateral hand pain  Has osteoarthritis knees  Father has RA  Driving painful with holding steering wheel  Painful all times of the day  Complains of swelling to joints  Took 600 mg ibuprofen without relief  Pain worse with humidity changes        Problem list and histories reviewed & adjusted, as indicated.  Additional history: none    Patient Active Problem List   Diagnosis     Advanced directives, counseling/discussion     Seasonal allergies     Personal history of kidney stones     Primary osteoarthritis of both knees     Past Surgical History:   Procedure Laterality Date     BUNIONECTOMY      right foot     C/SECTION, CLASSICAL      2 times     FUSION CERVICAL ANTERIOR ONE LEVEL      c4-5     SINUS SURGERY      deviated septum correction     TONSILLECTOMY & ADENOIDECTOMY         Social History   Substance Use Topics     Smoking status: Never Smoker     Smokeless tobacco: Never Used     Alcohol use No     Family History   Problem Relation Age of Onset     HEART DISEASE Father      MI in his 70's     Cancer Father      had kidney removed due to spot      Diabetes Paternal Grandmother      Diabetes Paternal Grandfather      Alzheimer Disease Sister 52     C.A.D. No family hx of      " "Breast Cancer No family hx of      Colon Cancer No family hx of            Reviewed and updated as needed this visit by clinical staff  Tobacco  Meds  Med Hx  Surg Hx  Fam Hx  Soc Hx      Reviewed and updated as needed this visit by Provider         ROS:  Constitutional, HEENT, cardiovascular, pulmonary, gi and gu systems are negative, except as otherwise noted.    OBJECTIVE:     /72 (BP Location: Right arm, Patient Position: Chair, Cuff Size: Adult Regular)  Pulse 97  Temp 98.9  F (37.2  C) (Oral)  Wt 164 lb (74.4 kg)  SpO2 99%  Breastfeeding? No  BMI 25.74 kg/m2  Body mass index is 25.74 kg/(m^2).  GENERAL: healthy, alert and no distress  RESP: lungs clear to auscultation - no rales, rhonchi or wheezes  CV: regular rate and rhythm, normal S1 S2, no S3 or S4, no murmur, click or rub, no peripheral edema and peripheral pulses strong  MS: Positive squeeze test bilaterally. No obvious joint inflammation (though patient reports this is swollen for her). Tenderness to all MCP and IP joints bilaterally  SKIN: no suspicious lesions or rashes  NEURO: Normal strength and tone, mentation intact and speech normal    Diagnostic Test Results:  ECG: SR with RBBB    ASSESSMENT/PLAN:   1. Rapid heartbeat  - Asymptomatic and ECG normal other than BBB which I don't think is contributing. I suspect \"pounding\" r/t stress as it only occurs while at work which produces anxiety for her. She is recently retired and started this job a few months ago. She plans to quit and will find something more enjoyable. I offered to do blood work today. She is overdue for annual exam. But she declines. We will monitor symptoms and if they don't resolve after employment change or she becomes symptomatic, will get Zio  - EKG 12-lead, tracing only    2. Stress  - see above    3. Arthralgia of both hands  - Will check labs given family history, though symptoms not consistent with RA. Would expect morning stiffness whereas she has pain " throughout the day. Discussed xrays though I don't think this will change our treatment plan. She also stated that if she has RA she is not interested in treatment. In the meantime, can treat with topical NSAID  - Rheumatoid factor  - Erythrocyte sedimentation rate auto  - Anaplasma phagocytoph antibody IgG IgM  - diclofenac (VOLTAREN) 1 % GEL topical gel; Apply  2 grams to hands four times daily using enclosed dosing card.  Dispense: 100 g; Refill: 1    Patient Instructions   If your develop symptoms along with pounding heart, or it does not resolve despite quitting job and reduction in stress, please let me know and I will order portable heart rate monitor for you      ALISA Coleman [facility]

## 2018-06-29 LAB
A PHAGOCYTOPH IGG TITR SER IF: NORMAL {TITER}
A PHAGOCYTOPH IGM TITR SER IF: NORMAL {TITER}
RHEUMATOID FACT SER NEPH-ACNC: <20 IU/ML (ref 0–20)

## 2018-07-02 NOTE — PROGRESS NOTES
[address] [address]  Phone: [phone]  Fax: [phone]      07/02/18    Deya Lee  [address]   [place] [address]      Dear Deya,    The results of your recent labs are enclosed.  Your labs results were within normal range which makes rheumatoid arthritis much less likely. Especially since your pain isn't a typical pattern of RA.   (Please ignore the test for Anaplasma. That was ordered in error and was cancelled prior to processing)  Please call the clinic if you have any concerns.    Sincerely,    ALISA Coleman CNP    Your [facility] Care Team

## 2018-09-25 ENCOUNTER — RADIANT APPOINTMENT (OUTPATIENT)
Dept: MAMMOGRAPHY | Facility: CLINIC | Age: 63
End: 2018-09-25
Payer: COMMERCIAL

## 2018-09-25 DIAGNOSIS — Z12.31 VISIT FOR SCREENING MAMMOGRAM: ICD-10-CM

## 2018-09-25 PROCEDURE — 77063 BREAST TOMOSYNTHESIS BI: CPT | Mod: TC

## 2018-09-25 PROCEDURE — 77067 SCR MAMMO BI INCL CAD: CPT | Mod: TC

## 2018-09-28 ENCOUNTER — RADIANT APPOINTMENT (OUTPATIENT)
Dept: MAMMOGRAPHY | Facility: CLINIC | Age: 63
End: 2018-09-28
Attending: INTERNAL MEDICINE
Payer: COMMERCIAL

## 2018-09-28 DIAGNOSIS — R92.8 ABNORMAL MAMMOGRAM: ICD-10-CM

## 2018-09-28 PROCEDURE — 77065 DX MAMMO INCL CAD UNI: CPT | Mod: RT | Performed by: RADIOLOGY

## 2019-03-12 ENCOUNTER — OFFICE VISIT (OUTPATIENT)
Dept: FAMILY MEDICINE | Facility: CLINIC | Age: 64
End: 2019-03-12
Payer: COMMERCIAL

## 2019-03-12 VITALS
OXYGEN SATURATION: 100 % | HEIGHT: 66 IN | HEART RATE: 96 BPM | DIASTOLIC BLOOD PRESSURE: 72 MMHG | SYSTOLIC BLOOD PRESSURE: 118 MMHG | WEIGHT: 150 LBS | BODY MASS INDEX: 24.11 KG/M2 | TEMPERATURE: 97.8 F

## 2019-03-12 DIAGNOSIS — R19.5 CHANGE IN STOOL: Primary | ICD-10-CM

## 2019-03-12 DIAGNOSIS — Z12.11 SPECIAL SCREENING FOR MALIGNANT NEOPLASMS, COLON: ICD-10-CM

## 2019-03-12 PROCEDURE — 99213 OFFICE O/P EST LOW 20 MIN: CPT | Performed by: NURSE PRACTITIONER

## 2019-03-12 RX ORDER — LORATADINE 10 MG/1
10 TABLET ORAL DAILY
COMMUNITY
End: 2020-06-23

## 2019-03-12 ASSESSMENT — MIFFLIN-ST. JEOR: SCORE: 1247.15

## 2019-03-12 ASSESSMENT — PAIN SCALES - GENERAL: PAINLEVEL: NO PAIN (0)

## 2019-03-12 NOTE — PROGRESS NOTES
"  SUBJECTIVE:   Deya Lee is a 63 year old female who presents to clinic today for the following health issues:      Patient has noticed changes in her BM, softer, more frequent and smell bad for the last 2 months. No pain or cramping.    Stools softer, more frequent, bad odor  Initially thought an infection so waited it out  Worm-like  No blood in the stool or black stool  Denies nausea, vomiting, early satiety, abdominal pain  Some increase in gas    15 pound intentional weight loss  Diet and exercise    Was doing FIT tests which were negative    No change in diet  She is on Miralax  1/2 dose, once daily  Has been on for years  She cut back on Miralax to every third day but then would feel constipated. When she would have a BM it would be soft, worm-like again    \"slight acid reflux\"  On Zantac which is effective    No family history of CRC        Problem list and histories reviewed & adjusted, as indicated.  Additional history: as documented    Patient Active Problem List   Diagnosis     Advanced directives, counseling/discussion     Seasonal allergies     Personal history of kidney stones     Primary osteoarthritis of both knees     Past Surgical History:   Procedure Laterality Date     BUNIONECTOMY      right foot     C/SECTION, CLASSICAL      2 times     FUSION CERVICAL ANTERIOR ONE LEVEL      c4-5     SINUS SURGERY      deviated septum correction     TONSILLECTOMY & ADENOIDECTOMY         Social History     Tobacco Use     Smoking status: Never Smoker     Smokeless tobacco: Never Used   Substance Use Topics     Alcohol use: No     Family History   Problem Relation Age of Onset     Heart Disease Father         MI in his 70's     Cancer Father         had kidney removed due to spot      Diabetes Paternal Grandmother      Diabetes Paternal Grandfather      Alzheimer Disease Sister 52     C.A.D. No family hx of      Breast Cancer No family hx of      Colon Cancer No family hx of          Current " "Outpatient Medications   Medication Sig Dispense Refill     loratadine (CLARITIN) 10 MG tablet Take 10 mg by mouth daily       polyethylene glycol (MIRALAX) powder Take 1 capful by mouth daily 2 tsp       ranitidine (ZANTAC) 150 MG tablet Take by mouth 2 times daily         Reviewed and updated as needed this visit by clinical staff  Tobacco  Allergies  Meds  Med Hx  Surg Hx  Fam Hx  Soc Hx      Reviewed and updated as needed this visit by Provider         ROS:  Constitutional, HEENT, cardiovascular, pulmonary, gi and gu systems are negative, except as otherwise noted.    OBJECTIVE:     /72 (BP Location: Right arm, Patient Position: Chair, Cuff Size: Adult Regular)   Pulse 96   Temp 97.8  F (36.6  C) (Oral)   Ht 1.676 m (5' 6\")   Wt 68 kg (150 lb)   SpO2 100%   Breastfeeding? No   BMI 24.21 kg/m    Body mass index is 24.21 kg/m .  GENERAL: healthy, alert and no distress  RESP: lungs clear to auscultation - no rales, rhonchi or wheezes  CV: regular rate and rhythm, normal S1 S2, no S3 or S4, no murmur, click or rub, no peripheral edema and peripheral pulses strong  ABDOMEN: soft, nontender, no hepatosplenomegaly, no masses and bowel sounds normal    Diagnostic Test Results:  none     ASSESSMENT/PLAN:       ICD-10-CM    1. Change in stool R19.5    2. Special screening for malignant neoplasms, colon Z12.11 GASTROENTEROLOGY ADULT REF PROCEDURE ONLY [facility] [phone]; [facility]       She previously was doing FIT and is due for annual colon cancer screening. Recommend colonoscopy d/t change in stool. Worm-like stools make me concerned about colon cancer.  If colonoscopy normal, recommend reviewing diet, trial w/o dairy. Trial w/o healthy choice, etc.     ALISA Coleman CNP  [facility]  "

## 2019-03-26 ENCOUNTER — HOSPITAL ENCOUNTER (OUTPATIENT)
Facility: AMBULATORY SURGERY CENTER | Age: 64
Discharge: HOME | End: 2019-03-26
Attending: SURGERY | Admitting: SURGERY
Payer: COMMERCIAL

## 2019-03-26 VITALS
OXYGEN SATURATION: 99 % | HEART RATE: 116 BPM | TEMPERATURE: 97.6 F | DIASTOLIC BLOOD PRESSURE: 75 MMHG | SYSTOLIC BLOOD PRESSURE: 122 MMHG | RESPIRATION RATE: 16 BRPM

## 2019-03-26 LAB — COLONOSCOPY: NORMAL

## 2019-03-26 PROCEDURE — 88305 TISSUE EXAM BY PATHOLOGIST: CPT | Performed by: SURGERY

## 2019-03-26 PROCEDURE — G8918 PT W/O PREOP ORDER IV AB PRO: HCPCS

## 2019-03-26 PROCEDURE — 45381 COLONOSCOPY SUBMUCOUS NJX: CPT

## 2019-03-26 PROCEDURE — G0500 MOD SEDAT ENDO SERVICE >5YRS: HCPCS | Performed by: SURGERY

## 2019-03-26 PROCEDURE — 45385 COLONOSCOPY W/LESION REMOVAL: CPT | Mod: PT | Performed by: SURGERY

## 2019-03-26 PROCEDURE — 45385 COLONOSCOPY W/LESION REMOVAL: CPT

## 2019-03-26 PROCEDURE — G8907 PT DOC NO EVENTS ON DISCHARG: HCPCS

## 2019-03-26 PROCEDURE — 45380 COLONOSCOPY AND BIOPSY: CPT | Mod: XS

## 2019-03-26 PROCEDURE — 45380 COLONOSCOPY AND BIOPSY: CPT | Mod: 59 | Performed by: SURGERY

## 2019-03-26 PROCEDURE — 45381 COLONOSCOPY SUBMUCOUS NJX: CPT | Mod: PT | Performed by: SURGERY

## 2019-03-26 RX ORDER — ONDANSETRON 2 MG/ML
4 INJECTION INTRAMUSCULAR; INTRAVENOUS
Status: DISCONTINUED | OUTPATIENT
Start: 2019-03-26 | End: 2019-03-27 | Stop reason: HOSPADM

## 2019-03-26 RX ORDER — LIDOCAINE 40 MG/G
CREAM TOPICAL
Status: DISCONTINUED | OUTPATIENT
Start: 2019-03-26 | End: 2019-03-27 | Stop reason: HOSPADM

## 2019-03-26 RX ORDER — FENTANYL CITRATE 50 UG/ML
INJECTION, SOLUTION INTRAMUSCULAR; INTRAVENOUS PRN
Status: DISCONTINUED | OUTPATIENT
Start: 2019-03-26 | End: 2019-03-26 | Stop reason: HOSPADM

## 2019-03-28 LAB — COPATH REPORT: NORMAL

## 2019-04-10 ENCOUNTER — TELEPHONE (OUTPATIENT)
Dept: SURGERY | Facility: CLINIC | Age: 64
End: 2019-04-10

## 2019-04-10 NOTE — TELEPHONE ENCOUNTER
Reason for Call:  Other call back    Detailed comments: Patient is calling to get results from her biopsy. Please call and advise Thank you.     Phone Number Patient can be reached at: Home number on file [phone] (home)    Best Time: any    Can we leave a detailed message on this number? YES    Call taken on 4/10/2019 at 8:08 AM by Ronit Tyson

## 2019-05-23 ENCOUNTER — OFFICE VISIT (OUTPATIENT)
Dept: FAMILY MEDICINE | Facility: CLINIC | Age: 64
End: 2019-05-23
Payer: COMMERCIAL

## 2019-05-23 VITALS
OXYGEN SATURATION: 100 % | DIASTOLIC BLOOD PRESSURE: 69 MMHG | SYSTOLIC BLOOD PRESSURE: 116 MMHG | WEIGHT: 149 LBS | HEART RATE: 86 BPM | BODY MASS INDEX: 24.05 KG/M2 | TEMPERATURE: 97.7 F

## 2019-05-23 DIAGNOSIS — R53.83 FATIGUE, UNSPECIFIED TYPE: ICD-10-CM

## 2019-05-23 DIAGNOSIS — F51.01 PRIMARY INSOMNIA: ICD-10-CM

## 2019-05-23 DIAGNOSIS — M25.50 MULTIPLE JOINT PAIN: ICD-10-CM

## 2019-05-23 DIAGNOSIS — M25.512 ACUTE PAIN OF BOTH SHOULDERS: Primary | ICD-10-CM

## 2019-05-23 DIAGNOSIS — M25.511 ACUTE PAIN OF BOTH SHOULDERS: Primary | ICD-10-CM

## 2019-05-23 LAB
ANION GAP SERPL CALCULATED.3IONS-SCNC: 4 MMOL/L (ref 3–14)
BUN SERPL-MCNC: 13 MG/DL (ref 7–30)
CALCIUM SERPL-MCNC: 9.2 MG/DL (ref 8.5–10.1)
CHLORIDE SERPL-SCNC: 102 MMOL/L (ref 94–109)
CO2 SERPL-SCNC: 30 MMOL/L (ref 20–32)
CREAT SERPL-MCNC: 0.76 MG/DL (ref 0.52–1.04)
CRP SERPL-MCNC: <2.9 MG/L (ref 0–8)
ERYTHROCYTE [SEDIMENTATION RATE] IN BLOOD BY WESTERGREN METHOD: 13 MM/H (ref 0–30)
GFR SERPL CREATININE-BSD FRML MDRD: 82 ML/MIN/{1.73_M2}
GLUCOSE SERPL-MCNC: 89 MG/DL (ref 70–99)
POTASSIUM SERPL-SCNC: 4 MMOL/L (ref 3.4–5.3)
SODIUM SERPL-SCNC: 136 MMOL/L (ref 133–144)
TSH SERPL DL<=0.005 MIU/L-ACNC: 0.94 MU/L (ref 0.4–4)
URATE SERPL-MCNC: 3.2 MG/DL (ref 2.6–6)

## 2019-05-23 PROCEDURE — 84550 ASSAY OF BLOOD/URIC ACID: CPT | Performed by: NURSE PRACTITIONER

## 2019-05-23 PROCEDURE — 36415 COLL VENOUS BLD VENIPUNCTURE: CPT | Performed by: NURSE PRACTITIONER

## 2019-05-23 PROCEDURE — 99214 OFFICE O/P EST MOD 30 MIN: CPT | Performed by: NURSE PRACTITIONER

## 2019-05-23 PROCEDURE — 80048 BASIC METABOLIC PNL TOTAL CA: CPT | Performed by: NURSE PRACTITIONER

## 2019-05-23 PROCEDURE — 86038 ANTINUCLEAR ANTIBODIES: CPT | Performed by: NURSE PRACTITIONER

## 2019-05-23 PROCEDURE — 85652 RBC SED RATE AUTOMATED: CPT | Performed by: NURSE PRACTITIONER

## 2019-05-23 PROCEDURE — 84443 ASSAY THYROID STIM HORMONE: CPT | Performed by: NURSE PRACTITIONER

## 2019-05-23 PROCEDURE — 86140 C-REACTIVE PROTEIN: CPT | Performed by: NURSE PRACTITIONER

## 2019-05-23 RX ORDER — TRAZODONE HYDROCHLORIDE 50 MG/1
50 TABLET, FILM COATED ORAL AT BEDTIME
Qty: 30 TABLET | Refills: 1 | Status: SHIPPED | OUTPATIENT
Start: 2019-05-23 | End: 2020-06-23

## 2019-05-23 ASSESSMENT — PAIN SCALES - GENERAL: PAINLEVEL: NO PAIN (0)

## 2019-05-23 NOTE — PATIENT INSTRUCTIONS
Take 1 tablet Trazodone 30 minutes before sleep  Do not fall asleep on the couch  Go to bed when you are tired

## 2019-05-23 NOTE — PROGRESS NOTES
"Subjective     Deya Lee is a 64 year old female who presents to clinic today for the following health issues:    HPI   Joint Pain    Onset: yesterday    Description:   Location: left shoulder and right shoulder  Character: Sharp    Intensity: severe    Progression of Symptoms: last only 1 day    Accompanying Signs & Symptoms:  Other symptoms: none    History:   Previous similar pain: YES- to her hands and her hips      Precipitating factors:   Trauma or overuse: no     Alleviating factors:  Improved by: nothing    Therapies Tried and outcome: Ibuprofen not helpful, did find a old tube of Diclofenac and maybe that helped, was taking the Ibuprofen.    I saw her last summer for bilateral hand pain  Lasted several months then resolved and has not occurred since  1 month ago had bilateral hip pain  \"to the bone\"  Very painful  Lasted 1-2 days and then resolved  Developed bilateral shoulder pain yesterday  Pains feel similar  Describes as \"to the bone\"  Some relief with Voltaren gel and would like a refill  Shoulder pain resolved today  When painful, constant throughout the day  No redness, warmth or swelling  No history of tick bites  She had normal sed rate and RF last summer  Father had RA    She had colonoscopy last year  Sill worm like stools  Cut out dairy and developed constipation  She takes 1 tsp Miralax daily which she has done for years  Wonders if stool changes are r/t stress    She is not sleeping well  Falls asleep on the couch around 9 pm for about an hour  Goes to bed and falls asleep right away  Wake up 1-2x/night to urinate  Usually can go back to sleep  Wakes up at 4 or 5 am  But sometimes wakes frequently and can't sleep  Snores  Won't do testing for sleep apnea  Wakes up and feels well rested but tired through the day      Patient Active Problem List   Diagnosis     Advanced directives, counseling/discussion     Seasonal allergies     Personal history of kidney stones     Primary " osteoarthritis of both knees     Past Surgical History:   Procedure Laterality Date     BUNIONECTOMY      right foot     C/SECTION, CLASSICAL      2 times     COLONOSCOPY       COLONOSCOPY N/A 3/26/2019    Procedure: Combined Colonoscopy, Single Or Multiple Biopsy/Polypectomy By Biopsy;  Surgeon: Jose L Leigh MD;  Location: [facility] OR     COLONOSCOPY WITH CO2 INSUFFLATION N/A 3/26/2019    Procedure: COLONOSCOPY WITH CO2 INSUFFLATION;  Surgeon: Jose L Leigh MD;  Location: [facility] OR     FUSION CERVICAL ANTERIOR ONE LEVEL      c4-5     SINUS SURGERY      deviated septum correction     TONSILLECTOMY & ADENOIDECTOMY         Social History     Tobacco Use     Smoking status: Never Smoker     Smokeless tobacco: Never Used   Substance Use Topics     Alcohol use: No     Family History   Problem Relation Age of Onset     Heart Disease Father         MI in his 70's     Cancer Father         had kidney removed due to spot      Diabetes Paternal Grandmother      Diabetes Paternal Grandfather      Alzheimer Disease Sister 52     C.A.D. No family hx of      Breast Cancer No family hx of      Colon Cancer No family hx of              Reviewed and updated as needed this visit by Provider         Review of Systems   ROS COMP: Constitutional, HEENT, cardiovascular, pulmonary, gi and gu systems are negative, except as otherwise noted.      Objective    /69 (BP Location: Left arm, Patient Position: Chair, Cuff Size: Adult Regular)   Pulse 86   Temp 97.7  F (36.5  C) (Oral)   Wt 67.6 kg (149 lb)   SpO2 100%   Breastfeeding? No   BMI 24.05 kg/m    Body mass index is 24.05 kg/m .  Physical Exam   GENERAL: healthy, alert and no distress  RESP: lungs clear to auscultation - no rales, rhonchi or wheezes  CV: regular rate and rhythm, normal S1 S2, no S3 or S4, no murmur, click or rub, no peripheral edema and peripheral pulses strong  MS: Gait intact. No joint inflammation. Negative squeeze test bilateral hands.  Tenderness bilateral shoulders. Full ROM without limitations. Upper extremity strength 5/5  SKIN: no suspicious lesions or rashes  NEURO: Normal strength and tone, mentation intact and speech normal  PSYCH: mentation appears normal, affect normal/bright    Diagnostic Test Results:  Labs reviewed in Epic        Assessment & Plan       ICD-10-CM    1. Acute pain of both shoulders M25.511 Erythrocyte sedimentation rate auto    M25.512 CRP, inflammation     Uric acid     Anti Nuclear Pearl IgG by IFA with Reflex     diclofenac (VOLTAREN) 1 % topical gel   2. Multiple joint pain M25.50 Erythrocyte sedimentation rate auto     CRP, inflammation     Uric acid     Anti Nuclear Pearl IgG by IFA with Reflex     diclofenac (VOLTAREN) 1 % topical gel   3. Fatigue, unspecified type R53.83 TSH with free T4 reflex     Basic metabolic panel   4. Primary insomnia F51.01 traZODone (DESYREL) 50 MG tablet        Reported symptoms not consistent with RA though does have family history  Checking ARISTEO as this was mistakenly omitted with previous labs   I am unsure the etiology of her pain. Consider OA.   She reports pain has resolved today  Very severe for 1 day  Consider gout and checking uric acid today  Discussed sleep hygiene. Will try Trazodone. Reviewed risks and benefits.   Discuss stool changes with normal colonoscopy  Could be r/t stress  Discussed mindful meditation and deep breathing exercises  F/U in 1 month    Patient Instructions   Take 1 tablet Trazodone 30 minutes before sleep  Do not fall asleep on the couch  Go to bed when you are tired        No follow-ups on file.    ALISA Coleman [facility]

## 2019-05-24 LAB — ANA SER QL IF: NEGATIVE

## 2019-05-24 NOTE — RESULT ENCOUNTER NOTE
Deya Lee,    Your lab results have been released to [facility].   Your blood work was within normal range.   CRP is a marker of inflammation and this was within normal range. Uric acid was normal and this makes gout less likely. ARISTEO can be a marker of rheumatological disease and this was negative.   I do not know what it causing your symptoms. Could be osteoarthritis, though your presentation (severe shoulder pain for 1 day) is unlikely. You could see an orthopedist for a second opinion.   I checked thyroid function because of your fatigue and this was normal as well.   Please call the clinic if you have any concerns [phone].    ALISA Coleman [facility]

## 2019-05-29 ENCOUNTER — TELEPHONE (OUTPATIENT)
Dept: FAMILY MEDICINE | Facility: CLINIC | Age: 64
End: 2019-05-29

## 2019-05-29 DIAGNOSIS — M25.50 MULTIPLE JOINT PAIN: ICD-10-CM

## 2019-05-29 DIAGNOSIS — M25.512 ACUTE PAIN OF LEFT SHOULDER: Primary | ICD-10-CM

## 2019-05-29 RX ORDER — IBUPROFEN 600 MG/1
600 TABLET, FILM COATED ORAL EVERY 6 HOURS PRN
Qty: 60 TABLET | Refills: 1 | Status: SHIPPED | OUTPATIENT
Start: 2019-05-29 | End: 2020-06-23

## 2019-05-29 NOTE — TELEPHONE ENCOUNTER
Called patient at [phone] (home). Left message on voicemail to return phone call to triage line at [phone].  Apple Espino RN [facility] Triage.

## 2019-05-29 NOTE — TELEPHONE ENCOUNTER
Reason for Call:  Medication or medication refill:    Do you use a [facility]?  Name of the pharmacy and phone number for the current request:  [facility]    Name of the medication requested: see below    Other request: Patient was seen in clinic for pain in her entire body. They did some blood work which did not show anything. She said that she has always had pain but when she came into the clinic, it was for an increased episode. She didn't ask for anything for pain and she is wondering if she could get a prescription for prescription ibuprofen.     Can we leave a detailed message on this number? YES    Phone number patient can be reached at: Home number on file [phone] (home)    Best Time: any    Call taken on 5/29/2019 at 9:34 AM by Beverly Sadler

## 2019-05-29 NOTE — TELEPHONE ENCOUNTER
patient returned call - left voice mail to call [phone]    Luli Rockwell RN  [facility]

## 2019-05-29 NOTE — TELEPHONE ENCOUNTER
I sent prescription for ibuprofen 600 mg.   Labwork was normal for a rheumatological cause and her symptoms weren't really fitting with RA  If her shoulder pain continue, she may be interested in seeing an orthopedist for a second opinion. Let me know if she wants a referral and if her insurance requires one

## 2019-05-30 NOTE — TELEPHONE ENCOUNTER
I called patient, she already picked up the ibuprofen and it helps a bit but she wants to get to the bottom of WHY she is having pain and stiffness all over her body, it is not just her shoulder and she does not think ortho referral will be helpful.       Routed back to Emily to advise on plan, more labs?   Follow up?  Other type of referral?    Patient feels like Emily is focused on the shoulder and is not attending to the overall body pain and stiffness.  Patient states she bought a new mattress, exercises daily, is not overweight, sees chiropractor but still has this pain.    Irene Orlando RN  [facility]

## 2019-05-31 NOTE — TELEPHONE ENCOUNTER
She could see a rheumatologist. I put in a referral. It could be up to 2-3 months before she can be seen, but if she is interested in this, it would be worthwhile scheduling now. Her labs were normal which is reassuring. Her symptoms don't really fit with RA or fibromyalgia. Beyond seeing a rheumatologist, and as she is already seeing a chiropractor, I don't have another suggestion, other than seeing a different primary care provider for a second opinion.     Referred to [place] and also outside of [place] network which may allow faster access     Your provider has referred you to: [facility]: [facility] [place] [phone]   [email or web address]  [facility] - [place] [phone]   [email or web address]

## 2019-05-31 NOTE — TELEPHONE ENCOUNTER
Attempt # 1  Called patient at home [phone]  Was call answered?  Yes, relayed message from PCP to patient, Patient verbalized understanding and declined the referral information - states has an appointment with a geriatric specialist.   Nurse advised if needs the referral information may call clinic at anytime for the information.         Luli Rockwell RN  [facility]

## 2019-06-18 ENCOUNTER — DOCUMENTATION ONLY (OUTPATIENT)
Dept: OTHER | Facility: CLINIC | Age: 64
End: 2019-06-18

## 2019-06-21 ENCOUNTER — DOCUMENTATION ONLY (OUTPATIENT)
Dept: OTHER | Facility: CLINIC | Age: 64
End: 2019-06-21

## 2019-10-17 ENCOUNTER — ANCILLARY PROCEDURE (OUTPATIENT)
Dept: MAMMOGRAPHY | Facility: CLINIC | Age: 64
End: 2019-10-17
Attending: INTERNAL MEDICINE
Payer: COMMERCIAL

## 2019-10-17 DIAGNOSIS — Z12.31 SCREENING MAMMOGRAM, ENCOUNTER FOR: ICD-10-CM

## 2019-10-17 PROCEDURE — 77063 BREAST TOMOSYNTHESIS BI: CPT | Mod: TC

## 2019-10-17 PROCEDURE — 77067 SCR MAMMO BI INCL CAD: CPT | Mod: TC

## 2020-01-29 ENCOUNTER — NURSE TRIAGE (OUTPATIENT)
Dept: PEDIATRICS | Facility: CLINIC | Age: 65
End: 2020-01-29

## 2020-01-29 NOTE — TELEPHONE ENCOUNTER
Routing to RN. Please triage patient. Patient has scheduled an appointment on 2/4/20 to see Dr Calle.      Patient Comments:         Feeling of not being able to take a deep breath - feel I'm only breathing shallow - starts when I lay down at night - feeling some tightness in chest - not every night - about 3 times in past week     Copied this from patients appointment note.      Mauar Enamorado

## 2020-01-29 NOTE — TELEPHONE ENCOUNTER
"Patient will keep her appointment with Dr. Calle on 2/4/20.   She also states that when she feels like she can't take a deep breath her anxiety kicks in and then she can't sleep.    Additional Information    Negative: Breathing stopped and hasn't returned    Negative: Choking on something    Negative: SEVERE difficulty breathing (e.g., struggling for each breath, speaks in single words, pulse > 120)    Negative: Bluish (or gray) lips or face    Negative: Difficult to awaken or acting confused (e.g., disoriented, slurred speech)    Negative: Passed out (i.e., fainted, collapsed and was not responding)    Negative: Wheezing started suddenly after medicine, an allergic food, or bee sting    Negative: Stridor    Negative: Slow, shallow and weak breathing    Negative: Sounds like a life-threatening emergency to the triager    Negative: Chest pain    Negative: Wheezing (high pitched whistling sound) and previous asthma attacks or use of asthma medicines    Negative: Difficulty breathing and only present when coughing    Negative: Difficulty breathing and only from stuffy or runny nose    Negative: MODERATE difficulty breathing (e.g., speaks in phrases, SOB even at rest, pulse 100-120) of new onset or worse than normal    Negative: Wheezing can be heard across the room    Negative: Drooling or spitting out saliva (because can't swallow)    Negative: Any history of prior \"blood clot\" in leg or lungs    Negative: Recent illness requiring prolonged bedrest (i.e., immobilization)    Negative: Hip or leg fracture in past 2 months (e.g., or had cast on leg or ankle)    Negative: Major surgery in the past month    Negative: Recent long-distance travel with prolonged time in car, bus, plane, or train (i.e., within past 2 weeks; 6 or  more hours duration)    Negative: Extra heart beats OR irregular heart beating   (i.e., \"palpitations\")    Negative: Fever > 103 F (39.4 C)    Negative: Fever > 101 F (38.3 C) and over 60 years of " "age    Negative: Fever > 100.0 F (37.8 C) and bedridden (e.g., nursing home patient, stroke, chronic illness, recovering from surgery)    Negative: Fever > 100.0 F (37.8 C) and diabetes mellitus or weak immune system (e.g., HIV positive, cancer chemo, splenectomy, organ transplant, chronic steroids)    Negative: Periods where breathing stops and then resumes normally and bedridden (e.g., nursing home patient, CVA)    Negative: Pregnant or postpartum (< 1 month since delivery)    Negative: Patient sounds very sick or weak to the triager    Negative: MILD difficulty breathing (e.g., minimal/no SOB at rest, SOB with walking, pulse < 100) of new onset or worse than normal    Negative: Longstanding difficulty breathing (e.g., CHF, COPD, emphysema) and worse than normal    Negative: Longstanding difficulty breathing and not responding to usual therapy    Negative: Continuous (nonstop) coughing    Negative: Patient wants to be seen    Negative: MODERATE longstanding difficulty breathing (e.g., speaks in phrases, SOB even at rest, pulse 100-120) and SAME as normal    Negative: MILD longstanding difficulty breathing (e.g., speaks in phrases, SOB even at rest, pulse 100-120) and SAME as normal    Answer Assessment - Initial Assessment Questions  1. RESPIRATORY STATUS: \"Describe your breathing?\" (e.g., wheezing, shortness of breath, unable to speak, severe coughing)       Unable to take a deep breath when I am in bed.  2. ONSET: \"When did this breathing problem begin?\"       Last week when I came down with a cold  3. PATTERN \"Does the difficult breathing come and go, or has it been constant since it started?\"       Comes and goes only at night but not every night  4. SEVERITY: \"How bad is your breathing?\" (e.g., mild, moderate, severe)     - MILD: No SOB at rest, mild SOB with walking, speaks normally in sentences, can lay down, no retractions, pulse < 100.     - MODERATE: SOB at rest, SOB with minimal exertion and prefers to " "sit, cannot lie down flat, speaks in phrases, mild retractions, audible wheezing, pulse 100-120.     - SEVERE: Very SOB at rest, speaks in single words, struggling to breathe, sitting hunched forward, retractions, pulse > 120       Mild. I am able to do my volunteering and household chores without problem  5. RECURRENT SYMPTOM: \"Have you had difficulty breathing before?\" If so, ask: \"When was the last time?\" and \"What happened that time?\"       Happens at night when I am lying down.Not every night.  6. CARDIAC HISTORY: \"Do you have any history of heart disease?\" (e.g., heart attack, angina, bypass surgery, angioplasty)       no  7. LUNG HISTORY: \"Do you have any history of lung disease?\"  (e.g., pulmonary embolus, asthma, emphysema)      no  8. CAUSE: \"What do you think is causing the breathing problem?\"       I don't know but I'm 64 years old so I should check it out.  9. OTHER SYMPTOMS: \"Do you have any other symptoms? (e.g., dizziness, runny nose, cough, chest pain, fever)      no  10. PREGNANCY: \"Is there any chance you are pregnant?\" \"When was your last menstrual period?\"        no  11. TRAVEL: \"Have you traveled out of the country in the last month?\" (e.g., travel history, exposures)        no    Protocols used: BREATHING DIFFICULTY-A-[place]    "

## 2020-02-04 ENCOUNTER — ANCILLARY PROCEDURE (OUTPATIENT)
Dept: GENERAL RADIOLOGY | Facility: CLINIC | Age: 65
End: 2020-02-04
Attending: FAMILY MEDICINE
Payer: COMMERCIAL

## 2020-02-04 ENCOUNTER — OFFICE VISIT (OUTPATIENT)
Dept: FAMILY MEDICINE | Facility: CLINIC | Age: 65
End: 2020-02-04
Payer: COMMERCIAL

## 2020-02-04 VITALS
DIASTOLIC BLOOD PRESSURE: 76 MMHG | TEMPERATURE: 97.8 F | WEIGHT: 155.4 LBS | BODY MASS INDEX: 25.08 KG/M2 | SYSTOLIC BLOOD PRESSURE: 128 MMHG | HEART RATE: 76 BPM

## 2020-02-04 DIAGNOSIS — R06.00 DYSPNEA, UNSPECIFIED TYPE: ICD-10-CM

## 2020-02-04 DIAGNOSIS — R06.00 DYSPNEA, UNSPECIFIED TYPE: Primary | ICD-10-CM

## 2020-02-04 PROBLEM — M85.88 OSTEOPENIA OF LUMBAR SPINE: Status: ACTIVE | Noted: 2019-08-12

## 2020-02-04 PROBLEM — G56.03 BILATERAL CARPAL TUNNEL SYNDROME: Status: ACTIVE | Noted: 2020-02-04

## 2020-02-04 PROBLEM — K58.9 IBS (IRRITABLE BOWEL SYNDROME): Status: ACTIVE | Noted: 2019-03-01

## 2020-02-04 PROBLEM — K44.9 HIATAL HERNIA: Status: ACTIVE | Noted: 2020-02-04

## 2020-02-04 PROBLEM — H91.90 HEARING LOSS: Status: ACTIVE | Noted: 2020-02-04

## 2020-02-04 PROBLEM — M79.7 FIBROMYALGIA AFFECTING MULTIPLE SITES: Status: ACTIVE | Noted: 2019-07-01

## 2020-02-04 PROBLEM — H93.19 TINNITUS: Status: ACTIVE | Noted: 2020-02-04

## 2020-02-04 PROBLEM — E78.5 HLD (HYPERLIPIDEMIA): Status: ACTIVE | Noted: 2020-02-04

## 2020-02-04 PROBLEM — N32.81 OVERACTIVE BLADDER: Status: ACTIVE | Noted: 2020-02-04

## 2020-02-04 PROBLEM — K21.9 GERD (GASTROESOPHAGEAL REFLUX DISEASE): Status: ACTIVE | Noted: 2020-02-04

## 2020-02-04 PROBLEM — M16.0 OSTEOARTHRITIS OF BOTH HIPS, UNSPECIFIED OSTEOARTHRITIS TYPE: Status: ACTIVE | Noted: 2019-08-01

## 2020-02-04 PROBLEM — E28.319 EARLY MENOPAUSE: Status: ACTIVE | Noted: 2020-02-04

## 2020-02-04 PROCEDURE — 99214 OFFICE O/P EST MOD 30 MIN: CPT | Performed by: FAMILY MEDICINE

## 2020-02-04 PROCEDURE — 93000 ELECTROCARDIOGRAM COMPLETE: CPT | Performed by: FAMILY MEDICINE

## 2020-02-04 PROCEDURE — 71046 X-RAY EXAM CHEST 2 VIEWS: CPT | Mod: FY

## 2020-02-04 RX ORDER — SIMETHICONE 125 MG
125 CAPSULE ORAL
COMMUNITY
Start: 2019-09-01 | End: 2021-05-13

## 2020-02-04 RX ORDER — OXYMETAZOLINE HYDROCHLORIDE 0.05 G/100ML
2 SPRAY NASAL
COMMUNITY
Start: 2018-01-01 | End: 2024-03-07

## 2020-02-04 RX ORDER — ACETAMINOPHEN 500 MG
1000 TABLET ORAL
COMMUNITY
Start: 2019-08-01 | End: 2021-07-14

## 2020-02-04 RX ORDER — OMEPRAZOLE 10 MG/1
20 CAPSULE, DELAYED RELEASE ORAL DAILY
COMMUNITY
Start: 2019-10-01 | End: 2024-02-12

## 2020-02-04 RX ORDER — CETIRIZINE HYDROCHLORIDE 10 MG/1
10 TABLET ORAL
COMMUNITY

## 2020-02-04 NOTE — PROGRESS NOTES
"Subjective     Deya Lee is a 64 year old female who presents to clinic today for the following health issues:    HPI   Concern - Chest tightness  Onset: 8 days ago    Description:   Tightness in chest, unable to take a deep breath     Intensity: moderate    Progression of Symptoms:  intermittent    Accompanying Signs & Symptoms:  Stomach more upset, back pain, feels more fatigued but may all be related to fibromyalgia     Previous history of similar problem:   Not like this     Precipitating factors:   Worsened by: No    Alleviating factors:  Improved by: No    Therapies Tried and outcome: no    Intermittent episodes of \"chest tightness\" where she feels like she can't take a full breath.  Initially was happening only at night.  Now happening pretty randomly throughout the day.  Can last for up to an hour.  When it started she had a mild cold, but that has improved.  Has generalized fatigue and some back pain, but was diagnosed with fibromyalgia last year.  Symptoms get worse when she lays flat.  No injuries or surgeries recently.    She also states that when she feels like she can't take a deep breath her anxiety kicks in and then she can't sleep.      Patient Active Problem List   Diagnosis     Seasonal allergies     Personal history of kidney stones     Primary osteoarthritis of both knees     Bilateral carpal tunnel syndrome     Early menopause     Fibromyalgia affecting multiple sites     GERD (gastroesophageal reflux disease)     Hearing loss     Hiatal hernia     HLD (hyperlipidemia)     IBS (irritable bowel syndrome)     Kidney stones     Osteoarthritis of both hips, unspecified osteoarthritis type     Osteopenia of lumbar spine     Overactive bladder     Tinnitus     Past Surgical History:   Procedure Laterality Date     BUNIONECTOMY      right foot     C/SECTION, CLASSICAL      2 times     COLONOSCOPY       COLONOSCOPY N/A 3/26/2019    Procedure: Combined Colonoscopy, Single Or Multiple " Biopsy/Polypectomy By Biopsy;  Surgeon: Jose L Leigh MD;  Location: [facility] OR     COLONOSCOPY WITH CO2 INSUFFLATION N/A 3/26/2019    Procedure: COLONOSCOPY WITH CO2 INSUFFLATION;  Surgeon: Jose L Leigh MD;  Location: [facility] OR     FUSION CERVICAL ANTERIOR ONE LEVEL      c4-5     SINUS SURGERY      deviated septum correction     TONSILLECTOMY & ADENOIDECTOMY         Social History     Tobacco Use     Smoking status: Never Smoker     Smokeless tobacco: Never Used   Substance Use Topics     Alcohol use: No     Family History   Problem Relation Age of Onset     Heart Disease Father         MI in his 70's     Cancer Father         had kidney removed due to spot      Diabetes Paternal Grandmother      Diabetes Paternal Grandfather      Alzheimer Disease Sister 52     C.A.D. No family hx of      Breast Cancer No family hx of      Colon Cancer No family hx of            Review of Systems   ROS COMP: Constitutional, HEENT, cardiovascular, pulmonary, GI, , musculoskeletal, neuro, skin, endocrine and psych systems are negative, except as otherwise noted.      Objective    /76 (BP Location: Right arm, Patient Position: Sitting, Cuff Size: Adult Regular)   Pulse 76   Temp 97.8  F (36.6  C) (Oral)   Wt 70.5 kg (155 lb 6.4 oz)   BMI 25.08 kg/m    Body mass index is 25.08 kg/m .  Physical Exam   GENERAL: healthy, alert and no distress  EYES: Eyes grossly normal to inspection, PERRL and conjunctivae and sclerae normal  HENT: ear canals and TM's normal, nose and mouth without ulcers or lesions  NECK: no adenopathy, no asymmetry, masses, or scars and thyroid normal to palpation  RESP: lungs clear to auscultation - no rales, rhonchi or wheezes  CV: regular rate and rhythm, normal S1 S2, no S3 or S4, no murmur, click or rub, no peripheral edema and peripheral pulses strong    Diagnostic Test Results:    CHEST TWO VIEWS  2/4/2020 10:03 AM   HISTORY: 64-year-old woman with intermittent dyspnea.   COMPARISON: None                                                       IMPRESSION: Heart size is normal. No pleural effusion, pneumothorax,  or abnormal area of consolidation.  CLIFFORD BALLESTEROS MD    EKG - appears normal, NSR, normal axis, normal intervals, no acute ST/T changes c/w ischemia, no LVH by voltage criteria, unchanged from previous tracings        Assessment & Plan     1. Dyspnea, unspecified type  - CXR and EKG are both completely normal  - Discussed option of checking a d-dimer, getting a holter monitor and/or echo and pt would like to defer for now  - Her symptoms don't sound consistent with classic PE symptoms and her vitals are completely stable  - She will watch the symptoms for now and report back if they don't resolve or are getting worse   - Suspect that she may just have a small amount of atelectasis from her recent viral illness that isn't showing up on XR.  Possibly an anxiety component as well   - EKG 12-lead complete w/read - Clinics  - XR Chest 2 Views; Future      Return in about 1 week (around 2/11/2020) for SOB if needed.    Linda Calle,   [facility]

## 2020-02-04 NOTE — PATIENT INSTRUCTIONS
Patient Education     Shortness of Breath (Dyspnea)  Shortness of breath is the feeling that you can't catch your breath or get enough air. It is also known as dyspnea.  Dyspnea can be caused by many different conditions. They include:    Acute asthma attack    Worsening of chronic lung diseases such as chronic bronchitis and emphysema    Heart failure. This is when weak heart muscle allows extra fluid to collect in the lungs.    Panic attacks or anxiety. Fear can cause rapid breathing (hyperventilation).    Pneumonia, or an infection in the lung tissue    Exposure to toxic substances, fumes, smoke, or certain medicines    Blood clot in the lung (pulmonary embolism). This is often from a piece of blood clot in a deep vein of the leg (deep vein thrombosis) that breaks off and travels to the lungs.    Heart attack or heart-related chest pain (angina)    Anemia    Collapsed lung (pneumothorax)    Dehydration    Pregnancy  Based on your visit today, the exact cause of your shortness of breath is not certain. Your tests don t show any of the serious causes of dyspnea. You may need other tests to find out if you have a serious problem. It s important to watch for any new symptoms or symptoms that get worse. Follow up with your healthcare provider as directed.  Home care  Follow these tips to take care of yourself at home:    When your symptoms are better, go back to your usual activities.    If you smoke, you should stop. Join a quit-smoking program or ask your healthcare provider for help.    Eat a healthy diet and get plenty of sleep.    Get regular exercise. Talk with your healthcare provider before starting to exercise, especially if you have other medical problems.    Cut down on the amount of caffeine and stimulants you consume.  Follow-up care  Follow up with your healthcare provider, or as advised.  If tests were done, you will be told if your treatment needs to be changed. You can call as directed for the  results.  If an X-ray was taken, a specialist will review it. You will be notified of any new findings that may affect your care.  Call 911  Shortness of breath may be a sign of a serious medical problem. For example, it may be a problem with your heart or lungs. Call 911 if you have worsening shortness of breath or trouble breathing, especially with any of the symptoms below:    Confusion or difficulty waking    Fainting or loss of consciousness.    Fast or irregular heartbeat    Coughing up blood    Pain in your chest, arm, shoulder, neck, or upper back    Sweating  When to seek medical advice  Call your healthcare provider right away if any of these occur:    Slight shortness of breath or wheezing    Redness, pain or swelling in your leg, arm, or other body area    Swelling in both legs or ankles    Fast weight gain    Dizziness or weakness    Fever of 100.4 F (38 C) or higher, or as directed by your healthcare provider  Date Last Reviewed: 6/1/2018 2000-2019 [employer]. [address] [place] [address], [place] [address]. All rights reserved. This information is not intended as a substitute for professional medical care. Always follow your healthcare professional's instructions.

## 2020-03-02 ENCOUNTER — HEALTH MAINTENANCE LETTER (OUTPATIENT)
Age: 65
End: 2020-03-02

## 2020-06-22 NOTE — PATIENT INSTRUCTIONS
Okay to take omeprazole and cetirizine with small sips of water on the morning of procedure.  Before Your Surgery      Call your surgeon if there is any change in your health. This includes signs of a cold or flu (such as a sore throat, runny nose, cough, rash or fever).    Do not smoke, drink alcohol or take over the counter medicine (unless your surgeon or primary care doctor tells you to) for the 24 hours before and after surgery.    If you take prescribed drugs: Follow your doctor s orders about which medicines to take and which to stop until after surgery.    Eating and drinking prior to surgery: follow the instructions from your surgeon    Take a shower or bath the night before surgery. Use the soap your surgeon gave you to gently clean your skin. If you do not have soap from your surgeon, use your regular soap. Do not shave or scrub the surgery site.  Wear clean pajamas and have clean sheets on your bed.

## 2020-06-22 NOTE — PROGRESS NOTES
[facility]  [address] [place] [id number]  [phone]  Dept: [phone]    PRE-OP EVALUATION:  Today's date: 2020    Deya Lee (: 1955) presents for pre-operative evaluation assessment as requested by Dr. Rosales Rodriguez.  She requires evaluation and anesthesia risk assessment prior to undergoing surgery/procedure for treatment of Dental implant .    Proposed Surgery/ Procedure: Dental implant  Date of Surgery/ Procedure: 2020  Time of Surgery/ Procedure: 9:00am  Hospital/Surgical Facility:   Primary Physician: Emily Escobar  Type of Anesthesia Anticipated: to be determined    Patient has a Health Care Directive or Living Will:  YES     1. NO - Do you have a history of heart attack, stroke, stent, bypass or surgery on an artery in the head, neck, heart or legs?  2. NO - Do you ever have any pain or discomfort in your chest?  3. NO - Do you have a history of  Heart Failure?  4. NO - Are you troubled by shortness of breath when: walking on the level, up a slight hill or at night?  5. NO - Do you currently have a cold, bronchitis or other respiratory infection?  6. NO - Do you have a cough, shortness of breath or wheezing?  7. NO - Do you sometimes get pains in the calves of your legs when you walk?  8. NO - Do you or anyone in your family have previous history of blood clots?  9. NO - Do you or does anyone in your family have a serious bleeding problem such as prolonged bleeding following surgeries or cuts?  10. NO - Have you ever had problems with anemia or been told to take iron pills?  11. NO - Have you had any abnormal blood loss such as black, tarry or bloody stools, or abnormal vaginal bleeding?  12. YES - HAVE YOU EVER HAD A BLOOD TRANSFUSION? 40 years ago- following  section.  13. NO - Have you or any of your relatives ever had problems with anesthesia?  14. NO - Do you have sleep apnea, excessive snoring or daytime drowsiness?  15. NO  - Do you have any prosthetic heart valves?  16. NO - Do you have prosthetic joints?  17. NO - Is there any chance that you may be pregnant?    Danielle Cook CNP     HPI:     HPI related to upcoming procedure: Patient is undergoing dental implants to improve dentition.      See problem list for active medical problems.  Problems all longstanding and stable, except as noted/documented.  See ROS for pertinent symptoms related to these conditions.      MEDICAL HISTORY:     Patient Active Problem List    Diagnosis Date Noted     Bilateral carpal tunnel syndrome 02/04/2020     Priority: Medium     Early menopause 02/04/2020     Priority: Medium     Age 43       GERD (gastroesophageal reflux disease) 02/04/2020     Priority: Medium     Hearing loss 02/04/2020     Priority: Medium     has a R hearing aid, does not like wearing it because it irritates her skin       Hiatal hernia 02/04/2020     Priority: Medium     HLD (hyperlipidemia) 02/04/2020     Priority: Medium     Overactive bladder 02/04/2020     Priority: Medium     No incontinence       Tinnitus 02/04/2020     Priority: Medium     has had for 20 years, chronic       Osteopenia of lumbar spine 08/12/2019     Priority: Medium     BMD 8/6/2019 Max neg T score -1.1 Lumbar spine. 10 yr FRAX risk MOF 16%, Hip 0.7%       Osteoarthritis of both hips, unspecified osteoarthritis type 08/01/2019     Priority: Medium     Fibromyalgia affecting multiple sites 07/01/2019     Priority: Medium     IBS (irritable bowel syndrome) 03/01/2019     Priority: Medium     Primary osteoarthritis of both knees 08/30/2016     Priority: Medium     Kidney stones 01/01/2013     Priority: Medium     Has had three stones - two passed spontaneously, one was removed surgically       Seasonal allergies 10/04/2012     Priority: Medium     Personal history of kidney stones 10/04/2012     Priority: Medium      Past Medical History:   Diagnosis Date     History of depression      Personal history of  "kidney stones 10/4/2012     Seasonal allergies 10/4/2012     Past Surgical History:   Procedure Laterality Date     BUNIONECTOMY      right foot     C/SECTION, CLASSICAL      2 times     COLONOSCOPY       COLONOSCOPY N/A 3/26/2019    Procedure: Combined Colonoscopy, Single Or Multiple Biopsy/Polypectomy By Biopsy;  Surgeon: Jose L Leigh MD;  Location: [facility] OR     COLONOSCOPY WITH CO2 INSUFFLATION N/A 3/26/2019    Procedure: COLONOSCOPY WITH CO2 INSUFFLATION;  Surgeon: Jose L Leigh MD;  Location: [facility] OR     FUSION CERVICAL ANTERIOR ONE LEVEL      c4-5     SINUS SURGERY      deviated septum correction     TONSILLECTOMY & ADENOIDECTOMY       Current Outpatient Medications   Medication Sig Dispense Refill     acetaminophen (TYLENOL) 500 MG tablet Take 1,000 mg by mouth       cetirizine (ZYRTEC) 10 MG tablet Take 10 mg by mouth       Menthol, Topical Analgesic, (BIOFREEZE) 4 % GEL        omeprazole (PRILOSEC) 10 MG DR capsule Take 20 mg by mouth daily       oxymetazoline (AFRIN) 0.05 % nasal spray Spray 2 sprays in nostril       polyethylene glycol (MIRALAX) powder Take by mouth daily 1 tsp       Propylene Glycol-Glycerin 1-0.3 % SOLN        Simethicone 125 MG CAPS Take 125 mg by mouth       OTC products: None, except as noted above    Allergies   Allergen Reactions     Codeine Phosphate Nausea     Erythromycin Nausea     Hmg-Coa-R Inhibitors Muscle Pain (Myalgia)     Muscle aches      Latex Allergy: NO    Social History     Tobacco Use     Smoking status: Never Smoker     Smokeless tobacco: Never Used   Substance Use Topics     Alcohol use: No     History   Drug Use No       REVIEW OF SYSTEMS:   Constitutional, neuro, ENT, endocrine, pulmonary, cardiac, gastrointestinal, genitourinary, musculoskeletal, integument and psychiatric systems are negative, except as otherwise noted.    EXAM:   /66   Pulse 107   Temp 97.7  F (36.5  C) (Oral)   Resp 14   Ht 1.676 m (5' 6\")   Wt 70.9 kg (156 lb 6.4 " oz)   SpO2 100%   BMI 25.24 kg/m      GENERAL APPEARANCE: healthy, alert and no distress     EYES: EOMI, PERRL     HENT: ear canals and TM's normal and nose and mouth without ulcers or lesions     NECK: no adenopathy, no asymmetry, masses, or scars and thyroid normal to palpation     RESP: lungs clear to auscultation - no rales, rhonchi or wheezes     CV: regular rates and rhythm, normal S1 S2, no S3 or S4 and no murmur, click or rub     ABDOMEN:  soft, nontender, no HSM or masses and bowel sounds normal     MS: extremities normal- no gross deformities noted, no evidence of inflammation in joints, FROM in all extremities.     SKIN: no suspicious lesions or rashes     NEURO: Normal strength and tone, sensory exam grossly normal, mentation intact and speech normal     PSYCH: mentation appears normal. and affect normal/bright     LYMPHATICS: No cervical adenopathy    DIAGNOSTICS:   EKG: Not indicated due to non-vascular surgery and low risk of event (age <65 and without cardiac risk factors)    Recent Labs   Lab Test 05/23/19  1241 10/11/12  0932 07/11/12  0629   HGB  --  13.6 13.2   PLT  --   --  265     --  138   POTASSIUM 4.0  --  3.8   CR 0.76  --  0.89        IMPRESSION:   Reason for surgery/procedure: Dental infection  Diagnosis/reason for consult: Management of comorbid conditions and preoperative exam.      The proposed surgical procedure is considered LOW risk.    REVISED CARDIAC RISK INDEX  The patient has the following serious cardiovascular risks for perioperative complications such as (MI, PE, VFib and 3  AV Block):  No serious cardiac risks  INTERPRETATION: 0 risks: Class I (very low risk - 0.4% complication rate)    The patient has the following additional risks for perioperative complications:  No identified additional risks      ICD-10-CM    1. Preop general physical exam  Z01.818    2. Dental infection  K04.7        RECOMMENDATIONS:       Cardiovascular Risk  Performs 4 METs exercise without  symptoms (Light housework (dusting, washing dishes) and Climb a flight of stairs) .       --Patient is to take all scheduled medications on the day of surgery    APPROVAL GIVEN to proceed with proposed procedure, without further diagnostic evaluation       Signed Electronically by: ALISA Hilliard CNP    Copy of this evaluation report is provided to requesting physician.    [place] Preop Guidelines    Revised Cardiac Risk Index

## 2020-06-23 ENCOUNTER — OFFICE VISIT (OUTPATIENT)
Dept: FAMILY MEDICINE | Facility: CLINIC | Age: 65
End: 2020-06-23
Payer: COMMERCIAL

## 2020-06-23 VITALS
RESPIRATION RATE: 14 BRPM | WEIGHT: 156.4 LBS | SYSTOLIC BLOOD PRESSURE: 134 MMHG | TEMPERATURE: 97.7 F | HEIGHT: 66 IN | OXYGEN SATURATION: 100 % | DIASTOLIC BLOOD PRESSURE: 66 MMHG | HEART RATE: 107 BPM | BODY MASS INDEX: 25.13 KG/M2

## 2020-06-23 DIAGNOSIS — K04.7 DENTAL INFECTION: ICD-10-CM

## 2020-06-23 DIAGNOSIS — Z01.818 PREOP GENERAL PHYSICAL EXAM: Primary | ICD-10-CM

## 2020-06-23 PROCEDURE — 99214 OFFICE O/P EST MOD 30 MIN: CPT | Performed by: NURSE PRACTITIONER

## 2020-06-23 ASSESSMENT — MIFFLIN-ST. JEOR: SCORE: 1266.18

## 2020-07-17 ENCOUNTER — VIRTUAL VISIT (OUTPATIENT)
Dept: FAMILY MEDICINE | Facility: CLINIC | Age: 65
End: 2020-07-17
Payer: COMMERCIAL

## 2020-07-17 DIAGNOSIS — Z71.1: ICD-10-CM

## 2020-07-17 DIAGNOSIS — R14.0 ABDOMINAL BLOATING: Primary | ICD-10-CM

## 2020-07-17 PROCEDURE — 99213 OFFICE O/P EST LOW 20 MIN: CPT | Mod: 95 | Performed by: INTERNAL MEDICINE

## 2020-07-17 NOTE — PROGRESS NOTES
"Deya Lee is a 65 year old female who is being evaluated via a billable telephone visit.      The patient has been notified of following:     \"This telephone visit will be conducted via a call between you and your physician/provider. We have found that certain health care needs can be provided without the need for a physical exam.  This service lets us provide the care you need with a short phone conversation.  If a prescription is necessary we can send it directly to your pharmacy.  If lab work is needed we can place an order for that and you can then stop by our lab to have the test done at a later time.    Telephone visits are billed at different rates depending on your insurance coverage. During this emergency period, for some insurers they may be billed the same as an in-person visit.  Please reach out to your insurance provider with any questions.    If during the course of the call the physician/provider feels a telephone visit is not appropriate, you will not be charged for this service.\"    Patient has given verbal consent for Telephone visit?  Yes    What phone number would you like to be contacted at? [phone]    How would you like to obtain your AVS? Jorge James     Deya Lee is a 65 year old female who presents via phone visit today for the following health issues:    HPI  64 y/o F h/o Fibromyalgia, OAB, Kidney stones, djd.      She made this appt because, she has felt bloated for a long time- 1 year plus  and is worried about ovarian cancer.      She uses miralax and simethecone for digestive symptoms, which helps.  She uses the miralax regularly   weight has been stable (150-160#) per flowsheet since 2012.     Last year colonoscopy done due to change in bowel habits revealed two diminutive polyps which were removed.   Last summer seeing specialists re: Back pain, dx with fibromyalgia.     She has felt a bloatingfullness all the time, worse with eating.  Has OAB. She has " regular BMs as long as she takes miralax.  Probiotic helped.   No JULIANA.  No SOB.   FmHx:  Dad had kidney cancer. No other known cancers.  She has 2 living siblings alive and well, her younger sister  months ago with Alzheimer's.       She had kidney stones in [place], had CT imaging 5-6 years ago.      Patient Active Problem List   Diagnosis     Seasonal allergies     Personal history of kidney stones     Primary osteoarthritis of both knees     Bilateral carpal tunnel syndrome     Early menopause     Fibromyalgia affecting multiple sites     GERD (gastroesophageal reflux disease)     Hearing loss     Hiatal hernia     HLD (hyperlipidemia)     IBS (irritable bowel syndrome)     Kidney stones     Osteoarthritis of both hips, unspecified osteoarthritis type     Osteopenia of lumbar spine     Overactive bladder     Tinnitus     Past Surgical History:   Procedure Laterality Date     BUNIONECTOMY      right foot     C/SECTION, CLASSICAL      2 times     COLONOSCOPY       COLONOSCOPY N/A 3/26/2019    Procedure: Combined Colonoscopy, Single Or Multiple Biopsy/Polypectomy By Biopsy;  Surgeon: Jose L Leigh MD;  Location: [facility]     COLONOSCOPY WITH CO2 INSUFFLATION N/A 3/26/2019    Procedure: COLONOSCOPY WITH CO2 INSUFFLATION;  Surgeon: Jose L Leigh MD;  Location: [facility]     FUSION CERVICAL ANTERIOR ONE LEVEL      c4-5     SINUS SURGERY      deviated septum correction     TONSILLECTOMY & ADENOIDECTOMY         Social History     Tobacco Use     Smoking status: Never Smoker     Smokeless tobacco: Never Used   Substance Use Topics     Alcohol use: No     Family History   Problem Relation Age of Onset     Heart Disease Father         MI in his 70's     Cancer Father         had kidney removed due to spot      Diabetes Paternal Grandmother      Diabetes Paternal Grandfather      Alzheimer Disease Sister 52     C.A.D. No family hx of      Breast Cancer No family hx of      Colon Cancer No family hx of           Current Outpatient Medications   Medication Sig Dispense Refill     acetaminophen (TYLENOL) 500 MG tablet Take 1,000 mg by mouth       cetirizine (ZYRTEC) 10 MG tablet Take 10 mg by mouth       Menthol, Topical Analgesic, (BIOFREEZE) 4 % GEL        omeprazole (PRILOSEC) 10 MG DR capsule Take 20 mg by mouth daily       oxymetazoline (AFRIN) 0.05 % nasal spray Spray 2 sprays in nostril       polyethylene glycol (MIRALAX) powder Take by mouth daily 1 tsp       Propylene Glycol-Glycerin 1-0.3 % SOLN        Simethicone 125 MG CAPS Take 125 mg by mouth       Allergies   Allergen Reactions     Codeine Phosphate Nausea     Erythromycin Nausea     Hmg-Coa-R Inhibitors Muscle Pain (Myalgia)     Muscle aches     Recent Labs   Lab Test 05/23/19  1241 08/30/16  0757 10/11/12  0932 07/11/12  0629   LDL  --  175* 184*  --    HDL  --  37* 34*  --    TRIG  --  144 120  --    CR 0.76  --   --  0.89   GFRESTIMATED 82  --   --  65   GFRESTBLACK >90  --   --  79   POTASSIUM 4.0  --   --  3.8   TSH 0.94  --  0.50  --       BP Readings from Last 3 Encounters:   06/23/20 134/66   02/04/20 128/76   05/23/19 116/69    Wt Readings from Last 3 Encounters:   06/23/20 70.9 kg (156 lb 6.4 oz)   02/04/20 70.5 kg (155 lb 6.4 oz)   05/23/19 67.6 kg (149 lb)                    Reviewed and updated as needed this visit by Provider         Review of Systems   Constitutional, HEENT, cardiovascular, pulmonary, gi and gu systems are negative, except as otherwise noted.       Objective   Reported vitals:  There were no vitals taken for this visit.   healthy, alert and no distress  PSYCH: Alert and oriented times 3; coherent speech, normal   rate and volume, able to articulate logical thoughts, able   to abstract reason, no tangential thoughts, no hallucinations   or delusions  Her affect is normal and pleasant  RESP: No cough, no audible wheezing, able to talk in full sentences  Remainder of exam unable to be completed due to telephone  visits    Diagnostic Test Results:  Labs reviewed in Epic        Assessment/Plan:    1. Abdominal bloating   fear of ovarian cancer.    screening is up to date  - US Pelvic Complete w Transvaginal; Future    2. Concern about female genital cancer without diagnosis  *  - US Pelvic Complete w Transvaginal; Future  --she is aware of possible incidental findings.       Patient Instructions   Call to schedule imaging  -- transvaginal ultrasound    For your bloating:  Try eliminating your daily fiber bar... use Metamucil or citrucil instead (soluble fiber) if you still feel you want to use daily fiber.        Return to clinic next year for routine physical (when safe)     Return in about 1 year (around 7/17/2021) for Physical Exam.      Phone call duration:  *22 minutes    Eunice Jose MD

## 2020-07-17 NOTE — PATIENT INSTRUCTIONS
Call to schedule imaging  -- transvaginal ultrasound    For your bloating:  Try eliminating your daily fiber bar... use Metamucil or citrucil instead (soluble fiber) if you still feel you want to use daily fiber.        Return to clinic next year for routine physical (when safe)

## 2020-07-20 ENCOUNTER — MYC MEDICAL ADVICE (OUTPATIENT)
Dept: FAMILY MEDICINE | Facility: CLINIC | Age: 65
End: 2020-07-20

## 2020-07-20 ENCOUNTER — ANCILLARY PROCEDURE (OUTPATIENT)
Dept: ULTRASOUND IMAGING | Facility: CLINIC | Age: 65
End: 2020-07-20
Attending: INTERNAL MEDICINE
Payer: COMMERCIAL

## 2020-07-20 DIAGNOSIS — R14.0 ABDOMINAL BLOATING: ICD-10-CM

## 2020-07-20 DIAGNOSIS — Z71.1: ICD-10-CM

## 2020-07-20 PROCEDURE — 76830 TRANSVAGINAL US NON-OB: CPT

## 2020-07-20 PROCEDURE — 76856 US EXAM PELVIC COMPLETE: CPT

## 2020-07-20 NOTE — RESULT ENCOUNTER NOTE
Deya Lee    This is a normal ultrasound for a post menopausal woman.  I hope this helps    Best,     DEYVI RAMIREZ M.D.     Send note

## 2020-08-17 ENCOUNTER — E-VISIT (OUTPATIENT)
Dept: FAMILY MEDICINE | Facility: CLINIC | Age: 65
End: 2020-08-17
Payer: COMMERCIAL

## 2020-08-17 DIAGNOSIS — J01.90 ACUTE SINUSITIS WITH SYMPTOMS > 10 DAYS: ICD-10-CM

## 2020-08-17 PROCEDURE — 99422 OL DIG E/M SVC 11-20 MIN: CPT | Performed by: INTERNAL MEDICINE

## 2020-08-20 DIAGNOSIS — J01.90 ACUTE SINUSITIS WITH SYMPTOMS > 10 DAYS: ICD-10-CM

## 2020-08-20 PROCEDURE — U0003 INFECTIOUS AGENT DETECTION BY NUCLEIC ACID (DNA OR RNA); SEVERE ACUTE RESPIRATORY SYNDROME CORONAVIRUS 2 (SARS-COV-2) (CORONAVIRUS DISEASE [COVID-19]), AMPLIFIED PROBE TECHNIQUE, MAKING USE OF HIGH THROUGHPUT TECHNOLOGIES AS DESCRIBED BY CMS-2020-01-R: HCPCS | Performed by: INTERNAL MEDICINE

## 2020-08-21 LAB
SARS-COV-2 RNA SPEC QL NAA+PROBE: NOT DETECTED
SPECIMEN SOURCE: NORMAL

## 2020-10-20 ENCOUNTER — VIRTUAL VISIT (OUTPATIENT)
Dept: FAMILY MEDICINE | Facility: CLINIC | Age: 65
End: 2020-10-20
Payer: COMMERCIAL

## 2020-10-20 DIAGNOSIS — R42 VERTIGO: Primary | ICD-10-CM

## 2020-10-20 DIAGNOSIS — H81.13 BENIGN PAROXYSMAL POSITIONAL VERTIGO DUE TO BILATERAL VESTIBULAR DISORDER: ICD-10-CM

## 2020-10-20 PROCEDURE — 99213 OFFICE O/P EST LOW 20 MIN: CPT | Mod: 95 | Performed by: NURSE PRACTITIONER

## 2020-10-20 NOTE — PROGRESS NOTES
"Deya Lee is a 65 year old female who is being evaluated via a billable video visit.      The patient has been notified of following:     \"This video visit will be conducted via a call between you and your physician/provider. We have found that certain health care needs can be provided without the need for an in-person physical exam.  This service lets us provide the care you need with a video conversation.  If a prescription is necessary we can send it directly to your pharmacy.  If lab work is needed we can place an order for that and you can then stop by our lab to have the test done at a later time.    Video visits are billed at different rates depending on your insurance coverage.  Please reach out to your insurance provider with any questions.    If during the course of the call the physician/provider feels a video visit is not appropriate, you will not be charged for this service.\"    Patient has given verbal consent for Video visit? Yes  How would you like to obtain your AVS? MyChart  If you are dropped from the video visit, the video invite should be resent to: Send to e-mail at: [email or web address]  Will anyone else be joining your video visit? No      Subjective     Deya Lee is a 65 year old female who presents today via video visit for the following health issues:    HPI     Dizziness  Onset/Duration: 4 days ago.  On Friday (4 days ago) it was mild just in the morning, she was able to exercise that day and do her normal activities.  Then on Saturday and Sunday it was mild in the morning.  But yesterday she felt dizzy all day, like a spinning sensation.  was just in the morning, but worse since yesterday.   Description:   Do you feel faint: YES  Does it feel like the surroundings (bed, room) are moving: YES- sometimes  Unsteady/off balance: YES  Have you passed out or fallen: no  Intensity: mild at first- but was severe yesterday   Progression of Symptoms: worsening and constant " yesterday.  Today it has improved.  Accompanying Signs & Symptoms:  Heart palpitations or chest pain: no  Nausea, vomiting: YES  Weakness or lack of coordination in arms or legs: no  Vision or speech changes: no  Numbness or tingling: no  Ringing in ears (Tinnitus): YES- for 20 years  Hearing Loss: YES- has been years   History:   Head trauma/concussion history: YES- fell 2 weeks ago and hit head.  Says her family checked her for concussion but was fine other than some soreness on back of head.  Previous similar symptoms: YES- 30 years ago -dx with ear infection then   Recent bleeding history: no  Any new medications (BP?): no  Precipitating factors:   Worse with activity: not sure, has slowed down with activity   Worse with head movement: YES  Alleviating factors:   Does staying in a fixed position give relief: YES  Therapies tried and outcome: None       Video Start Time: 7:06 am      Review of Systems   Constitutional, HEENT, cardiovascular, pulmonary, gi and gu systems are negative, except as otherwise noted.      Objective           Vitals:  No vitals were obtained today due to virtual visit.    Physical Exam     GENERAL: Healthy, alert and no distress  EYES: Eyes grossly normal to inspection.  No discharge or erythema, or obvious scleral/conjunctival abnormalities.  RESP: No audible wheeze, cough, or visible cyanosis.  No visible retractions or increased work of breathing.    SKIN: Visible skin clear. No significant rash, abnormal pigmentation or lesions.  NEURO: Cranial nerves grossly intact.  Mentation and speech appropriate for age.  PSYCH: Mentation appears normal, affect normal/bright, judgement and insight intact, normal speech and appearance well-groomed.            Assessment & Plan     Vertigo  Unclear etiology today due to video visit and not able to do an examination.  Symptoms today are improving.  Discussed possible benign paroxysmal positional vertigo but again, without an examination and  "work-up, unable to know for sure.    Patient will monitor her symptoms for the next few days.  If her symptoms reoccur or worsen, she will contact me and then she will need to be seen in the office.  I am out of the office this week but I can add her on to be seen next week; otherwise if she needs to be seen this week then she would have to see one of my colleagues.     BMI:   Estimated body mass index is 25.24 kg/m  as calculated from the following:    Height as of 6/23/20: 1.676 m (5' 6\").    Weight as of 6/23/20: 70.9 kg (156 lb 6.4 oz).          Return in about 6 days (around 10/26/2020) for follow up vertigo if symptoms persist or do not resolve.    Jaqui Hardy, CHELSEY  [facility]      Video-Visit Details    Type of service:  Video Visit    Video End Time: 7:24 am    Originating Location (pt. Location): Home    Distant Location (provider location):  [facility]     Platform used for Video Visit: Moo          "

## 2020-10-22 ENCOUNTER — HOSPITAL ENCOUNTER (OUTPATIENT)
Dept: PHYSICAL THERAPY | Facility: CLINIC | Age: 65
Setting detail: THERAPIES SERIES
End: 2020-10-22
Attending: NURSE PRACTITIONER
Payer: COMMERCIAL

## 2020-10-22 DIAGNOSIS — R42 VERTIGO: ICD-10-CM

## 2020-10-22 PROCEDURE — 95992 CANALITH REPOSITIONING PROC: CPT | Mod: GP | Performed by: PHYSICAL THERAPIST

## 2020-10-22 PROCEDURE — 97161 PT EVAL LOW COMPLEX 20 MIN: CPT | Mod: GP | Performed by: PHYSICAL THERAPIST

## 2020-10-22 NOTE — PROGRESS NOTES
10/22/20 1200   Quick Adds   Quick Adds Vestibular Eval   General Information   Start of Care Date 10/22/20   Referring Physician Jaqui Hardy NP   Orders Evaluate and Treat as Indicated   Order Date 10/20/20   Medical Diagnosis vertigo, bppv   Onset of illness/injury or Date of Surgery 10/17/20   Surgical/Medical history reviewed Yes   Pertinent history of current vestibular problem (include personal factors and/or comorbidities that impact the POC)  Hearing loss   Pertinent history of current problem (include personal factors and/or comorbidities that impact the POC) vertigo since last friday. spinning, tippy.  fell to hands / knees got up and ok. monday was bad all day.  HA.  not bad since monday.  i accommodate/ move slower.  lives in apt does steps.  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  drove here.  feel it either way rolling.  sit/supine i feel it.  no hx migraines.  has sinus issues.  neck fusion c4/5.  20 yr ago.  fell 2 wk ago and hit back of head.  no concussion sxs.  no LOC.     Pertinent Visual History  glasses   Prior level of functional mobility Ambulation   Prior level of function comment exercises daily   Current Community Support Family/friend caregiver   Patient role/Employment history Retired   Living environment Apartment/condo   Home/Community Accessibility Comments drives, can do stairs.  moving slowly.  volunteers at NH   Patient/Family Goals Statement to not be dizzy.  get to floor w/ 9 and 4 yo [place]   General Information Comments her alone   Fall Risk Screen   Fall screen completed by PT   Have you fallen 2 or more times in the past year? No   Have you fallen and had an injury in the past year? Yes   Fall screen comments fell hit back of head 2 wk ago when getting up while playing [employer]/ [place]   System Outcome Measures   Outcome Measures BPPV   Dizziness Handicap Inventory (score out of 100) A decrease in score by 17.18 or greater indicates a clinically significant change in  symptoms. 24   Pain   Pain comments fibromyalgia pain   Cognitive Status Examination   Orientation orientation to person, place and time   Level of Consciousness alert   Follows Commands and Answers Questions 100% of the time   Personal Safety and Judgment intact   Memory intact   Integumentary   Integumentary No deficits were identified   Posture   Posture Forward head position   Range of Motion (ROM)   ROM Comment wfls, neck ext slowly due to dizzy sxs   Strength   Strength Comments wfls   Bed Mobility   Bed Mobility Comments slow due to sxs   Transfer Skills   Transfer Comments STS indep   Locomotion   Wheel Chair Mobility Comments n/a   Gait Special Tests   Gait Special Tests 25 FOOT TIMED WALK   Gait Special Tests 25 Foot Timed Walk   Seconds 8   Sensory Examination   Sensory Perception no deficits were identified   Coordination   Coordination no deficits were identified   Muscle Tone   Muscle Tone no deficits were identified   Cervicogenic Screen   Neck ROM 75% of NL   Oculomotor Exam   Smooth Pursuit Normal   Saccades Normal   Infrared Goggle Exam or Frenzel Lense Exam   Vestibular Suppressant in Last 24 Hours? No   Exam completed with Infrared Goggles   Spontaneous Nystagmus Negative   Gaze Evoked Nystagmus Negative   Center-Hallpike (right) Upbeating R torsional   Center-Hallpike (Left) Upbeating L torsional   HSCC Supine Roll Test (Right) Downbeating R torsional   HSCC Supine Roll Test (Left) Upbeating L torsional   BPPV Canal(s) L Posterior;R Posterior   BPPV Type Canalithasis   Planned Therapy Interventions   Planned Therapy Interventions other (see comments);neuromuscular re-education;transfer training   Clinical Impression   Criteria for Skilled Therapeutic Interventions Met yes, treatment indicated   PT Diagnosis bilat bppv   Influenced by the following impairments nystagmus, sxs   Functional limitations due to impairments slow to do daily tasks, can't exer or play w/ grandkids   Clinical Presentation  Stable/Uncomplicated   Clinical Decision Making (Complexity) Low complexity   Therapy Frequency other (see comments)   Predicted Duration of Therapy Intervention (days/wks) up to 6x in 90 days   Risk & Benefits of therapy have been explained Yes   Patient, Family & other staff in agreement with plan of care Yes   Clinical Impression Comments bilat bppv after fall, hit head 2 wk ago.   Education Assessment   Preferred Learning Style Demonstration   Barriers to Learning Hearing   GOALS   PT Eval Goals 1;2;3   Goal 1   Goal Identifier sxs   Goal Description dhi to be 10 or less to show improved sxs   Target Date 01/19/21   Goal 2   Goal Identifier floor tx   Goal Description pt to perform a floor tx indep w/out sxs   Target Date 01/19/21   Goal 3   Goal Identifier home exer   Goal Description pt to perform her home exer program indep w/out sxs   Target Date 01/19/21   Total Evaluation Time   PT Brock Low Complexity Minutes (84170) 22

## 2020-10-22 NOTE — PROGRESS NOTES
[facility]        OUTPATIENT PHYSICAL THERAPY FUNCTIONAL EVALUATION  PLAN OF TREATMENT FOR OUTPATIENT REHABILITATION  (COMPLETE FOR INITIAL CLAIMS ONLY)  Patient's Last Name, First Name, M.I.  YOB: 1955  Deya Lee     Provider's Name   [facility]   Medical Record No.  [id number]     Start of Care Date:  10/22/20   Onset Date:  10/17/20   Type:     _X__PT   ____OT  ____SLP Medical Diagnosis:   bppv   PT Diagnosis:  bilat bppv Visits from SOC:  1                              __________________________________________________________________________________  Plan of Treatment/Functional Goals:  other (see comments), neuromuscular re-education, transfer training     GOALS  sxs  dhi to be 10 or less to show improved sxs  01/19/21    floor tx  pt to perform a floor tx indep w/out sxs  01/19/21    home exer  pt to perform her home exer program indep w/out sxs  01/19/21          Therapy Frequency:  other (see comments)   Predicted Duration of Therapy Intervention:  up to 6x in 90 days    Anastasia Heredia, PT                                    I CERTIFY THE NEED FOR THESE SERVICES FURNISHED UNDER        THIS PLAN OF TREATMENT AND WHILE UNDER MY CARE     (Physician co-signature of this document indicates review and certification of the therapy plan).                Certification Date From:    10/22/20  Certification Date To:   1/19/21    Referring Provider:  Jaqui Hardy NP    Initial Assessment  See Epic Evaluation- Start of Care Date: 10/22/20

## 2020-10-23 ENCOUNTER — HOSPITAL ENCOUNTER (OUTPATIENT)
Dept: PHYSICAL THERAPY | Facility: CLINIC | Age: 65
Setting detail: THERAPIES SERIES
End: 2020-10-23
Attending: NURSE PRACTITIONER
Payer: COMMERCIAL

## 2020-10-23 PROCEDURE — 95992 CANALITH REPOSITIONING PROC: CPT | Mod: GP | Performed by: PHYSICAL THERAPIST

## 2020-10-29 ENCOUNTER — HOSPITAL ENCOUNTER (OUTPATIENT)
Dept: PHYSICAL THERAPY | Facility: CLINIC | Age: 65
Setting detail: THERAPIES SERIES
End: 2020-10-29
Attending: NURSE PRACTITIONER
Payer: COMMERCIAL

## 2020-10-29 PROCEDURE — 97112 NEUROMUSCULAR REEDUCATION: CPT | Mod: GP,59 | Performed by: PHYSICAL THERAPIST

## 2020-10-29 PROCEDURE — 95992 CANALITH REPOSITIONING PROC: CPT | Mod: GP | Performed by: PHYSICAL THERAPIST

## 2020-11-30 ENCOUNTER — OFFICE VISIT (OUTPATIENT)
Dept: FAMILY MEDICINE | Facility: CLINIC | Age: 65
End: 2020-11-30
Payer: COMMERCIAL

## 2020-11-30 VITALS
WEIGHT: 158 LBS | HEART RATE: 84 BPM | DIASTOLIC BLOOD PRESSURE: 65 MMHG | BODY MASS INDEX: 25.5 KG/M2 | SYSTOLIC BLOOD PRESSURE: 120 MMHG

## 2020-11-30 DIAGNOSIS — J31.2 SORE THROAT, CHRONIC: Primary | ICD-10-CM

## 2020-11-30 DIAGNOSIS — J01.90 ACUTE SINUSITIS WITH SYMPTOMS > 10 DAYS: ICD-10-CM

## 2020-11-30 LAB
DEPRECATED S PYO AG THROAT QL EIA: NEGATIVE
SPECIMEN SOURCE: NORMAL
SPECIMEN SOURCE: NORMAL
STREP GROUP A PCR: NOT DETECTED

## 2020-11-30 PROCEDURE — 99214 OFFICE O/P EST MOD 30 MIN: CPT | Performed by: INTERNAL MEDICINE

## 2020-11-30 PROCEDURE — 99N1174 PR STATISTIC STREP A RAPID: Performed by: INTERNAL MEDICINE

## 2020-11-30 PROCEDURE — 87651 STREP A DNA AMP PROBE: CPT | Performed by: INTERNAL MEDICINE

## 2020-11-30 PROCEDURE — U0003 INFECTIOUS AGENT DETECTION BY NUCLEIC ACID (DNA OR RNA); SEVERE ACUTE RESPIRATORY SYNDROME CORONAVIRUS 2 (SARS-COV-2) (CORONAVIRUS DISEASE [COVID-19]), AMPLIFIED PROBE TECHNIQUE, MAKING USE OF HIGH THROUGHPUT TECHNOLOGIES AS DESCRIBED BY CMS-2020-01-R: HCPCS | Performed by: INTERNAL MEDICINE

## 2020-11-30 NOTE — RESULT ENCOUNTER NOTE
Deya Lee    So far the strep screening is negative.  COVID test will be back in a day or two     Sincerely,     DEYVI RAMIREZ M.D.

## 2020-11-30 NOTE — PATIENT INSTRUCTIONS
Augmentin course for 10 days       [place]  [facility] [place] [phone]

## 2020-11-30 NOTE — PROGRESS NOTES
Subjective     Deya Lee is a 65 year old female who presents to clinic today for the following health issues:    HPI     66 y/o F h/o Fibromyalgia, OAB, Kidney stones, djd.   C/o Sore throat with sinus drainage     Sore throat   Started late summer on and off.  Entire throat, quite severe.  Was on zpack over summer with partial relief.   Has some clear sinus drainage.   No fevers.   No cough.   Dry eyes.  Does not feel her throat is dry.     No cough.  No SOB.  Using a humidifier.            Review of Systems   Constitutional, HEENT, cardiovascular, pulmonary, gi and gu systems are negative, except as otherwise noted.      Objective    /65   Pulse 84   Wt 71.7 kg (158 lb)   BMI 25.50 kg/m    There is no height or weight on file to calculate BMI.  Physical Exam   GENERAL: healthy, alert and no distress  EYES: Eyes grossly normal to inspection, PERRL and conjunctivae and sclerae normal  HENT: normal oropharynx.  Normal palatal rise.   NECK: no adenopathy, no asymmetry, masses, or scars and thyroid normal to palpation  RESP: lungs clear to auscultation - no rales, rhonchi or wheezes    Results for orders placed or performed in visit on 11/30/20 (from the past 24 hour(s))   Streptococcus A Rapid Scr w Reflx to PCR    Specimen: Throat   Result Value Ref Range    Strep Specimen Description Throat     Streptococcus Group A Rapid Screen Negative NEG^Negative       COVID swab is pending.     Assessment & Plan     Diagnoses and all orders for this visit:    Sore throat, chronic  -     Streptococcus A Rapid Scr w Reflx to PCR  -     Symptomatic COVID-19 Virus (Coronavirus) by PCR  -     OTOLARYNGOLOGY REFERRAL  -     Group A Streptococcus PCR Throat Swab    Acute sinusitis with symptoms > 10 days  -     amoxicillin-clavulanate (AUGMENTIN) 875-125 MG tablet; Take 1 tablet by mouth 2 times daily    since summer having diffuse throat pain, waxing and waning but can feel quite severe at times.    Abx seemed to  "help when given in August.   Given another course Abx.   Exam here appeared normal.    Will refer to ENT for Eval and treat.      BMI:   Estimated body mass index is 25.5 kg/m  as calculated from the following:    Height as of 6/23/20: 1.676 m (5' 6\").    Weight as of this encounter: 71.7 kg (158 lb).          Patient Instructions   Augmentin course for 10 days     ENT REFERRAL:  [facility] [place] [phone]           Return in about 6 months (around 5/30/2021) for Physical Exam.    Eunice Jose MD  [facility]    "

## 2020-12-01 ENCOUNTER — OFFICE VISIT (OUTPATIENT)
Dept: OTOLARYNGOLOGY | Facility: CLINIC | Age: 65
End: 2020-12-01
Payer: COMMERCIAL

## 2020-12-01 VITALS
OXYGEN SATURATION: 99 % | DIASTOLIC BLOOD PRESSURE: 78 MMHG | HEART RATE: 103 BPM | WEIGHT: 158 LBS | BODY MASS INDEX: 25.39 KG/M2 | HEIGHT: 66 IN | SYSTOLIC BLOOD PRESSURE: 125 MMHG

## 2020-12-01 DIAGNOSIS — J32.0 CHRONIC MAXILLARY SINUSITIS: Primary | ICD-10-CM

## 2020-12-01 LAB
GRAM STN SPEC: NORMAL
GRAM STN SPEC: NORMAL
Lab: NORMAL
SARS-COV-2 RNA SPEC QL NAA+PROBE: NOT DETECTED
SPECIMEN SOURCE: NORMAL
SPECIMEN SOURCE: NORMAL

## 2020-12-01 PROCEDURE — 87070 CULTURE OTHR SPECIMN AEROBIC: CPT | Performed by: OTOLARYNGOLOGY

## 2020-12-01 PROCEDURE — 87205 SMEAR GRAM STAIN: CPT | Performed by: OTOLARYNGOLOGY

## 2020-12-01 PROCEDURE — 99204 OFFICE O/P NEW MOD 45 MIN: CPT | Performed by: OTOLARYNGOLOGY

## 2020-12-01 RX ORDER — METHYLPREDNISOLONE 4 MG
TABLET, DOSE PACK ORAL
Qty: 21 TABLET | Refills: 0 | Status: SHIPPED | OUTPATIENT
Start: 2020-12-01 | End: 2021-05-13

## 2020-12-01 ASSESSMENT — MIFFLIN-ST. JEOR: SCORE: 1273.43

## 2020-12-01 NOTE — LETTER
"    12/1/2020         RE: Deya Lee  [address]  [place] [address]        Dear Colleague,    Thank you for referring your patient, Deya Lee, to the [facility]. Please see a copy of my visit note below.    I am seeing this patient in consultation for sore throat at the request of the provider Dr. Eunice Jose.    Chief Complaint - sore throat    History of Present Illness - Deya Lee is a 65 year old female who presents with a history of sore throat. This has been going on for 7 days. They describe the sore throat as located \"all over\" in throat. She feels it is from sinus drainage. A similar thing happened 8/2020. She went on an antibiotic then and it got better. She has postnasal drainage, not much for congestion. she has a dry cough. No sinus pain or pressure. She use to have sinusitis 5-6 times a year until 40s, but then they slowed. Is getting raspy voice. They note no relux. She takes prilosec every day. Strep test was negative. COVID was negative. They have tried over-the-counter medication. Tylenol doesn't help. This hasn't helped much. No dysphagia. Nonsmoker.     Past Medical History -   Patient Active Problem List   Diagnosis     Seasonal allergies     Personal history of kidney stones     Primary osteoarthritis of both knees     Bilateral carpal tunnel syndrome     Early menopause     Fibromyalgia affecting multiple sites     GERD (gastroesophageal reflux disease)     Hearing loss     Hiatal hernia     HLD (hyperlipidemia)     IBS (irritable bowel syndrome)     Kidney stones     Osteoarthritis of both hips, unspecified osteoarthritis type     Osteopenia of lumbar spine     Overactive bladder     Tinnitus       Current Medications -   Current Outpatient Medications:      acetaminophen (TYLENOL) 500 MG tablet, Take 1,000 mg by mouth, Disp: , Rfl:      amoxicillin-clavulanate (AUGMENTIN) 875-125 MG tablet, Take 1 tablet by mouth 2 " times daily, Disp: 20 tablet, Rfl: 0     cetirizine (ZYRTEC) 10 MG tablet, Take 10 mg by mouth, Disp: , Rfl:      Menthol, Topical Analgesic, (BIOFREEZE) 4 % GEL, , Disp: , Rfl:      omeprazole (PRILOSEC) 10 MG DR capsule, Take 20 mg by mouth daily, Disp: , Rfl:      oxymetazoline (AFRIN) 0.05 % nasal spray, Spray 2 sprays in nostril, Disp: , Rfl:      polyethylene glycol (MIRALAX) powder, Take by mouth daily 1 tsp, Disp: , Rfl:      Simethicone 125 MG CAPS, Take 125 mg by mouth, Disp: , Rfl:     Allergies -   Allergies   Allergen Reactions     Codeine Phosphate Nausea     Erythromycin Nausea     Hmg-Coa-R Inhibitors Muscle Pain (Myalgia)     Muscle aches       Social History -   Social History     Socioeconomic History     Marital status:      Spouse name: Not on file     Number of children: 1     Years of education: Not on file     Highest education level: Not on file   Occupational History     Not on file   Social Needs     Financial resource strain: Not on file     Food insecurity     Worry: Not on file     Inability: Not on file     Transportation needs     Medical: Not on file     Non-medical: Not on file   Tobacco Use     Smoking status: Never Smoker     Smokeless tobacco: Never Used   Substance and Sexual Activity     Alcohol use: No     Drug use: No     Sexual activity: Not Currently   Lifestyle     Physical activity     Days per week: Not on file     Minutes per session: Not on file     Stress: Not on file   Relationships     Social connections     Talks on phone: Not on file     Gets together: Not on file     Attends Voodoo service: Not on file     Active member of club or organization: Not on file     Attends meetings of clubs or organizations: Not on file     Relationship status: Not on file     Intimate partner violence     Fear of current or ex partner: Not on file     Emotionally abused: Not on file     Physically abused: Not on file     Forced sexual activity: Not on file   Other Topics  "Concern     Parent/sibling w/ CABG, MI or angioplasty before 65F 55M? No   Social History Narrative     Not on file       Family History -   Family History   Problem Relation Age of Onset     Heart Disease Father         MI in his 70's     Cancer Father         had kidney removed due to spot      Diabetes Paternal Grandmother      Diabetes Paternal Grandfather      Alzheimer Disease Sister 52     C.A.D. No family hx of      Breast Cancer No family hx of      Colon Cancer No family hx of        Review of Systems - As per HPI and PMHx, otherwise 10+ comprehensive system review is negative.    Physical Exam  /78   Pulse 103   Ht 1.676 m (5' 6\")   Wt 71.7 kg (158 lb)   SpO2 99%   BMI 25.50 kg/m    General - The patient is in no distress. Alert and oriented to person and place, answers questions and cooperates with examination appropriately.   Voice and Breathing - The patient was breathing comfortably without the use of accessory muscles. There was no wheezing, stridor, or stertor.  The patients voice was clear and strong.  Eyes - Extraocular movements intact.  Sclera were not icteric or injected, conjunctiva were pink and moist.  Mouth - Examination of the oral cavity showed pink, healthy oral mucosa. No lesions or ulcerations noted.  The tongue was mobile and midline.  Throat - The walls of the oropharynx were smooth, symmetric, and had no lesions or ulcerations.  The tonsillar pillars and soft palate were symmetric.  The uvula was midline on elevation. Tonsils absent. No postnasal drainage.  Nose - External contour is symmetric, no gross deflection or scars.  Nasal mucosa is pink and moist with no abnormal mucus.  The septum was midline, but the turbinates were very congested.  No polyps.  Right middle meatus had purulence. I cultured this.  Neck -  Soft, non-tender. Palpation of the occipital, submental, submandibular, internal jugular chain, and supraclavicular nodes did not demonstrate any abnormal " lymph nodes or masses. No parotid masses. Palpation of the thyroid was soft and smooth, with no nodules or goiter appreciated.  The trachea was mobile and midline.  Neurologic - CN II-XII are grossly intact, no focal neurologic deficits.   Cardiovascular - carotid pulses are 2+ bilaterally, regular rhythm      A/P - Deya Lee is a 65 year old female with a sore throat. Exam revealed nasal congestion and purulence. I cultured this. This maybe sinusitis. Had this a few months ago and also had a dental implant. I recommend a CT sinus to look for chronic sinusitis. She is on augmentin, continue this. I also recommend a medrol dose pack. I'll call with CT results.          Moo Serrato MD  Otolaryngology  [facility]        Again, thank you for allowing me to participate in the care of your patient.        Sincerely,        Moo Serrato MD

## 2020-12-01 NOTE — PROGRESS NOTES
"I am seeing this patient in consultation for sore throat at the request of the provider Dr. Eunice Jose.    Chief Complaint - sore throat    History of Present Illness - Deya Lee is a 65 year old female who presents with a history of sore throat. This has been going on for 7 days. They describe the sore throat as located \"all over\" in throat. She feels it is from sinus drainage. A similar thing happened 8/2020. She went on an antibiotic then and it got better. She has postnasal drainage, not much for congestion. she has a dry cough. No sinus pain or pressure. She use to have sinusitis 5-6 times a year until 40s, but then they slowed. Is getting raspy voice. They note no relux. She takes prilosec every day. Strep test was negative. COVID was negative. They have tried over-the-counter medication. Tylenol doesn't help. This hasn't helped much. No dysphagia. Nonsmoker.     Past Medical History -   Patient Active Problem List   Diagnosis     Seasonal allergies     Personal history of kidney stones     Primary osteoarthritis of both knees     Bilateral carpal tunnel syndrome     Early menopause     Fibromyalgia affecting multiple sites     GERD (gastroesophageal reflux disease)     Hearing loss     Hiatal hernia     HLD (hyperlipidemia)     IBS (irritable bowel syndrome)     Kidney stones     Osteoarthritis of both hips, unspecified osteoarthritis type     Osteopenia of lumbar spine     Overactive bladder     Tinnitus       Current Medications -   Current Outpatient Medications:      acetaminophen (TYLENOL) 500 MG tablet, Take 1,000 mg by mouth, Disp: , Rfl:      amoxicillin-clavulanate (AUGMENTIN) 875-125 MG tablet, Take 1 tablet by mouth 2 times daily, Disp: 20 tablet, Rfl: 0     cetirizine (ZYRTEC) 10 MG tablet, Take 10 mg by mouth, Disp: , Rfl:      Menthol, Topical Analgesic, (BIOFREEZE) 4 % GEL, , Disp: , Rfl:      omeprazole (PRILOSEC) 10 MG DR capsule, Take 20 mg by mouth daily, Disp: , Rfl:      " oxymetazoline (AFRIN) 0.05 % nasal spray, Spray 2 sprays in nostril, Disp: , Rfl:      polyethylene glycol (MIRALAX) powder, Take by mouth daily 1 tsp, Disp: , Rfl:      Simethicone 125 MG CAPS, Take 125 mg by mouth, Disp: , Rfl:     Allergies -   Allergies   Allergen Reactions     Codeine Phosphate Nausea     Erythromycin Nausea     Hmg-Coa-R Inhibitors Muscle Pain (Myalgia)     Muscle aches       Social History -   Social History     Socioeconomic History     Marital status:      Spouse name: Not on file     Number of children: 1     Years of education: Not on file     Highest education level: Not on file   Occupational History     Not on file   Social Needs     Financial resource strain: Not on file     Food insecurity     Worry: Not on file     Inability: Not on file     Transportation needs     Medical: Not on file     Non-medical: Not on file   Tobacco Use     Smoking status: Never Smoker     Smokeless tobacco: Never Used   Substance and Sexual Activity     Alcohol use: No     Drug use: No     Sexual activity: Not Currently   Lifestyle     Physical activity     Days per week: Not on file     Minutes per session: Not on file     Stress: Not on file   Relationships     Social connections     Talks on phone: Not on file     Gets together: Not on file     Attends Episcopal service: Not on file     Active member of club or organization: Not on file     Attends meetings of clubs or organizations: Not on file     Relationship status: Not on file     Intimate partner violence     Fear of current or ex partner: Not on file     Emotionally abused: Not on file     Physically abused: Not on file     Forced sexual activity: Not on file   Other Topics Concern     Parent/sibling w/ CABG, MI or angioplasty before 65F 55M? No   Social History Narrative     Not on file       Family History -   Family History   Problem Relation Age of Onset     Heart Disease Father         MI in his 70's     Cancer Father         had  "kidney removed due to spot      Diabetes Paternal Grandmother      Diabetes Paternal Grandfather      Alzheimer Disease Sister 52     C.A.D. No family hx of      Breast Cancer No family hx of      Colon Cancer No family hx of        Review of Systems - As per HPI and PMHx, otherwise 10+ comprehensive system review is negative.    Physical Exam  /78   Pulse 103   Ht 1.676 m (5' 6\")   Wt 71.7 kg (158 lb)   SpO2 99%   BMI 25.50 kg/m    General - The patient is in no distress. Alert and oriented to person and place, answers questions and cooperates with examination appropriately.   Voice and Breathing - The patient was breathing comfortably without the use of accessory muscles. There was no wheezing, stridor, or stertor.  The patients voice was clear and strong.  Eyes - Extraocular movements intact.  Sclera were not icteric or injected, conjunctiva were pink and moist.  Mouth - Examination of the oral cavity showed pink, healthy oral mucosa. No lesions or ulcerations noted.  The tongue was mobile and midline.  Throat - The walls of the oropharynx were smooth, symmetric, and had no lesions or ulcerations.  The tonsillar pillars and soft palate were symmetric.  The uvula was midline on elevation. Tonsils absent. No postnasal drainage.  Nose - External contour is symmetric, no gross deflection or scars.  Nasal mucosa is pink and moist with no abnormal mucus.  The septum was midline, but the turbinates were very congested.  No polyps.  Right middle meatus had purulence. I cultured this.  Neck -  Soft, non-tender. Palpation of the occipital, submental, submandibular, internal jugular chain, and supraclavicular nodes did not demonstrate any abnormal lymph nodes or masses. No parotid masses. Palpation of the thyroid was soft and smooth, with no nodules or goiter appreciated.  The trachea was mobile and midline.  Neurologic - CN II-XII are grossly intact, no focal neurologic deficits.   Cardiovascular - carotid " pulses are 2+ bilaterally, regular rhythm      A/P - Deya Lee is a 65 year old female with a sore throat. Exam revealed nasal congestion and purulence. I cultured this. This maybe sinusitis. Had this a few months ago and also had a dental implant. I recommend a CT sinus to look for chronic sinusitis. She is on augmentin, continue this. I also recommend a medrol dose pack. I'll call with CT results.          Moo Serrato MD  Otolaryngology  [facility]

## 2020-12-03 LAB
BACTERIA SPEC CULT: ABNORMAL
Lab: ABNORMAL
SPECIMEN SOURCE: ABNORMAL

## 2020-12-04 ENCOUNTER — MYC MEDICAL ADVICE (OUTPATIENT)
Dept: FAMILY MEDICINE | Facility: CLINIC | Age: 65
End: 2020-12-04

## 2020-12-04 DIAGNOSIS — Z12.31 VISIT FOR SCREENING MAMMOGRAM: ICD-10-CM

## 2020-12-09 ENCOUNTER — MYC MEDICAL ADVICE (OUTPATIENT)
Dept: FAMILY MEDICINE | Facility: CLINIC | Age: 65
End: 2020-12-09

## 2020-12-09 NOTE — TELEPHONE ENCOUNTER
Routed to PCP to please advise.    Apple BSN-RN  Triage Nurse  [facility]

## 2021-03-29 NOTE — PROGRESS NOTES
"Chief Complaint - sore throat    History of Present Illness - Deya Lee is a 66 year old female who returns with a history of sore throat and hoarseness. Sinus symptoms have been going on intermittently for 7-8 months. I saw her 12/1/20 for sinusitis. She described symptoms of sinus congestion, sore throat as located \"all over\" in throat. She feels it is from sinus drainage. A similar thing happened intermittently since August. She went on an antibiotic twice in the past and things got better. No sinus pain or pressure. She use to have sinusitis 5-6 times a year until 40s, but then they slowed. Is getting raspy voice. She notes no relux. She takes prilosec every day. Strep test was negative. COVID was negative. No dysphagia. Nonsmoker. She had purulent postnasal drainage when I saw her last visit 12/20. She was treated with augmentin and a medrol dosepack. She got better. Culture grew no pathologic bacteria. I ordered a CT sinus, but she failed to have this done.     She feels her voice has changed. She cannot sing very well. Her most recent symptoms have been going on for 10 days. 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 She has to talk loud. She doesn't always have congestion or sinus drainage now or always.     Past Medical History -   Patient Active Problem List   Diagnosis     Seasonal allergies     Personal history of kidney stones     Primary osteoarthritis of both knees     Bilateral carpal tunnel syndrome     Early menopause     Fibromyalgia affecting multiple sites     GERD (gastroesophageal reflux disease)     Hearing loss     Hiatal hernia     HLD (hyperlipidemia)     IBS (irritable bowel syndrome)     Kidney stones     Osteoarthritis of both hips, unspecified osteoarthritis type     Osteopenia of lumbar spine     Overactive bladder     Tinnitus       Current Medications -   Current Outpatient Medications:      acetaminophen (TYLENOL) 500 MG tablet, Take 1,000 mg by mouth, Disp: , Rfl:      " amoxicillin-clavulanate (AUGMENTIN) 875-125 MG tablet, Take 1 tablet by mouth 2 times daily, Disp: 20 tablet, Rfl: 0     cetirizine (ZYRTEC) 10 MG tablet, Take 10 mg by mouth, Disp: , Rfl:      Menthol, Topical Analgesic, (BIOFREEZE) 4 % GEL, , Disp: , Rfl:      methylPREDNISolone (MEDROL DOSEPAK) 4 MG tablet therapy pack, Follow Package Directions, Disp: 21 tablet, Rfl: 0     omeprazole (PRILOSEC) 10 MG DR capsule, Take 20 mg by mouth daily, Disp: , Rfl:      oxymetazoline (AFRIN) 0.05 % nasal spray, Spray 2 sprays in nostril, Disp: , Rfl:      polyethylene glycol (MIRALAX) powder, Take by mouth daily 1 tsp, Disp: , Rfl:      Simethicone 125 MG CAPS, Take 125 mg by mouth, Disp: , Rfl:     Allergies -   Allergies   Allergen Reactions     Codeine Phosphate Nausea     Erythromycin Nausea     Hmg-Coa-R Inhibitors Muscle Pain (Myalgia)     Muscle aches       Social History -   Social History     Socioeconomic History     Marital status:      Spouse name: Not on file     Number of children: 1     Years of education: Not on file     Highest education level: Not on file   Occupational History     Not on file   Social Needs     Financial resource strain: Not on file     Food insecurity     Worry: Not on file     Inability: Not on file     Transportation needs     Medical: Not on file     Non-medical: Not on file   Tobacco Use     Smoking status: Never Smoker     Smokeless tobacco: Never Used   Substance and Sexual Activity     Alcohol use: No     Drug use: No     Sexual activity: Not Currently   Lifestyle     Physical activity     Days per week: Not on file     Minutes per session: Not on file     Stress: Not on file   Relationships     Social connections     Talks on phone: Not on file     Gets together: Not on file     Attends Anglican service: Not on file     Active member of club or organization: Not on file     Attends meetings of clubs or organizations: Not on file     Relationship status: Not on file      Intimate partner violence     Fear of current or ex partner: Not on file     Emotionally abused: Not on file     Physically abused: Not on file     Forced sexual activity: Not on file   Other Topics Concern     Parent/sibling w/ CABG, MI or angioplasty before 65F 55M? No   Social History Narrative     Not on file       Family History -   Family History   Problem Relation Age of Onset     Heart Disease Father         MI in his 70's     Cancer Father         had kidney removed due to spot      Diabetes Paternal Grandmother      Diabetes Paternal Grandfather      Alzheimer Disease Sister 52     C.A.D. No family hx of      Breast Cancer No family hx of      Colon Cancer No family hx of        Review of Systems - As per HPI and PMHx, otherwise 7 system review of the head and neck is negative.    Physical Exam  /81   Pulse 104   Resp 18   SpO2 100%   General - The patient is in no distress. Alert and oriented to person and place, answers questions and cooperates with examination appropriately.   Voice and Breathing - The patient was breathing comfortably without the use of accessory muscles. There was no wheezing, stridor, or stertor.  The patients voice was mildly coarse.  Eyes - Extraocular movements intact.  Sclera were not icteric or injected, conjunctiva were pink and moist.  Mouth - Examination of the oral cavity showed pink, healthy oral mucosa. No lesions or ulcerations noted.  The tongue was mobile and midline.  Throat - The walls of the oropharynx were smooth, symmetric, and had no lesions or ulcerations.  The tonsillar pillars and soft palate were symmetric.  The uvula was midline on elevation. Tonsils absent. No postnasal drainage.  Nose - External contour is symmetric, no gross deflection or scars.  Nasal mucosa is pink and moist with no abnormal mucus.  The septum was midline, some turbinate congestion. No pus or polyps.   Neck -  Soft, non-tender. Palpation of the occipital, submental,  submandibular, internal jugular chain, and supraclavicular nodes did not demonstrate any abnormal lymph nodes or masses. No parotid masses. Palpation of the thyroid was soft and smooth, with no nodules or goiter appreciated.  The trachea was mobile and midline.  Neurologic - CN II-XII are grossly intact, no focal neurologic deficits.     Flexible Laryngoscopy -     Given the chief complaint, history, and physical examination, and to best visualize the airway anatomy, I proceeded with a fiberoptic examination.  Color photographs were taken for the permanent medical record. First I sprayed the right side of the nose with a mixture of lidocaine and neosynephrine.   I then passed the scope through the nasal cavity.  The nasal cavity was unremarkable. No pus or postnasal drainage. The nasopharynx was mucosally covered and symmetric.  The Eustachian tube openings were unobstructed.  Going further down I had a clear view of the base of tongue which had normal appearing lingual tonsillar tissue.  The base of tongue was free of lesions, masses, and the vallecula was open.  The epiglottis was smooth and mucosally covered.  The supraglottic larynx was then clearly visualized and was normal.  The vocal cords had mild erythema, but no lesions were seen. She has some vocal cord atrophy and presbylarynges. Full range of motion of both vocal cords. Not a lot of supraglottic squeeze. The pyriform sinuses were open, and the limited view of the postcricoid region did not show any lesions.        A/P - Deya Lee is a 66 year old female with a sore throat and hoarseness.  No evidence of infection on today's exam.  No vocal cord lesions.  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  I encouraged her to use a microphone more so she does not have to overuse her voice.  She does feel like she has voice strain when she does this.  This contributes to sore throat and hoarseness.  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  I recommend she undergo voice therapy.  She should also use lots of hydration during speaking and singing.  Return if this fails.      Moo Serrato MD  Otolaryngology  [facility]

## 2021-03-30 ENCOUNTER — OFFICE VISIT (OUTPATIENT)
Dept: OTOLARYNGOLOGY | Facility: CLINIC | Age: 66
End: 2021-03-30
Payer: COMMERCIAL

## 2021-03-30 VITALS
RESPIRATION RATE: 18 BRPM | SYSTOLIC BLOOD PRESSURE: 136 MMHG | OXYGEN SATURATION: 100 % | HEART RATE: 104 BPM | DIASTOLIC BLOOD PRESSURE: 81 MMHG

## 2021-03-30 DIAGNOSIS — R49.0 MUSCLE TENSION DYSPHONIA: Primary | ICD-10-CM

## 2021-03-30 DIAGNOSIS — J02.9 SORE THROAT: ICD-10-CM

## 2021-03-30 DIAGNOSIS — R49.8 VOICE STRAIN: ICD-10-CM

## 2021-03-30 PROCEDURE — 99213 OFFICE O/P EST LOW 20 MIN: CPT | Mod: 25 | Performed by: OTOLARYNGOLOGY

## 2021-03-30 PROCEDURE — 31575 DIAGNOSTIC LARYNGOSCOPY: CPT | Performed by: OTOLARYNGOLOGY

## 2021-03-30 RX ORDER — METHYLPREDNISOLONE 4 MG
TABLET, DOSE PACK ORAL
Qty: 21 TABLET | Refills: 0 | Status: SHIPPED | OUTPATIENT
Start: 2021-03-30 | End: 2021-05-13

## 2021-03-30 NOTE — LETTER
"    3/30/2021         RE: Deya Lee  [address]  [place] [id number]        Dear Colleague,    Thank you for referring your patient, Deya Lee, to the [facility]. Please see a copy of my visit note below.    Chief Complaint - sore throat    History of Present Illness - Deya Lee is a 66 year old female who returns with a history of sore throat and hoarseness. Sinus symptoms have been going on intermittently for 7-8 months. I saw her 12/1/20 for sinusitis. She described symptoms of sinus congestion, sore throat as located \"all over\" in throat. She feels it is from sinus drainage. A similar thing happened intermittently since August. She went on an antibiotic twice in the past and things got better. No sinus pain or pressure. She use to have sinusitis 5-6 times a year until 40s, but then they slowed. Is getting raspy voice. She notes no relux. She takes prilosec every day. Strep test was negative. COVID was negative. No dysphagia. Nonsmoker. She had purulent postnasal drainage when I saw her last visit 12/20. She was treated with augmentin and a medrol dosepack. She got better. Culture grew no pathologic bacteria. I ordered a CT sinus, but she failed to have this done.     She feels her voice has changed. She cannot sing very well. Her most recent symptoms have been going on for 10 days. 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 She has to talk loud. She doesn't always have congestion or sinus drainage now or always.     Past Medical History -   Patient Active Problem List   Diagnosis     Seasonal allergies     Personal history of kidney stones     Primary osteoarthritis of both knees     Bilateral carpal tunnel syndrome     Early menopause     Fibromyalgia affecting multiple sites     GERD (gastroesophageal reflux disease)     Hearing loss     Hiatal hernia     HLD (hyperlipidemia)     IBS (irritable bowel syndrome)     Kidney stones     " Osteoarthritis of both hips, unspecified osteoarthritis type     Osteopenia of lumbar spine     Overactive bladder     Tinnitus       Current Medications -   Current Outpatient Medications:      acetaminophen (TYLENOL) 500 MG tablet, Take 1,000 mg by mouth, Disp: , Rfl:      amoxicillin-clavulanate (AUGMENTIN) 875-125 MG tablet, Take 1 tablet by mouth 2 times daily, Disp: 20 tablet, Rfl: 0     cetirizine (ZYRTEC) 10 MG tablet, Take 10 mg by mouth, Disp: , Rfl:      Menthol, Topical Analgesic, (BIOFREEZE) 4 % GEL, , Disp: , Rfl:      methylPREDNISolone (MEDROL DOSEPAK) 4 MG tablet therapy pack, Follow Package Directions, Disp: 21 tablet, Rfl: 0     omeprazole (PRILOSEC) 10 MG DR capsule, Take 20 mg by mouth daily, Disp: , Rfl:      oxymetazoline (AFRIN) 0.05 % nasal spray, Spray 2 sprays in nostril, Disp: , Rfl:      polyethylene glycol (MIRALAX) powder, Take by mouth daily 1 tsp, Disp: , Rfl:      Simethicone 125 MG CAPS, Take 125 mg by mouth, Disp: , Rfl:     Allergies -   Allergies   Allergen Reactions     Codeine Phosphate Nausea     Erythromycin Nausea     Hmg-Coa-R Inhibitors Muscle Pain (Myalgia)     Muscle aches       Social History -   Social History     Socioeconomic History     Marital status:      Spouse name: Not on file     Number of children: 1     Years of education: Not on file     Highest education level: Not on file   Occupational History     Not on file   Social Needs     Financial resource strain: Not on file     Food insecurity     Worry: Not on file     Inability: Not on file     Transportation needs     Medical: Not on file     Non-medical: Not on file   Tobacco Use     Smoking status: Never Smoker     Smokeless tobacco: Never Used   Substance and Sexual Activity     Alcohol use: No     Drug use: No     Sexual activity: Not Currently   Lifestyle     Physical activity     Days per week: Not on file     Minutes per session: Not on file     Stress: Not on file   Relationships     Social  connections     Talks on phone: Not on file     Gets together: Not on file     Attends Cheondoism service: Not on file     Active member of club or organization: Not on file     Attends meetings of clubs or organizations: Not on file     Relationship status: Not on file     Intimate partner violence     Fear of current or ex partner: Not on file     Emotionally abused: Not on file     Physically abused: Not on file     Forced sexual activity: Not on file   Other Topics Concern     Parent/sibling w/ CABG, MI or angioplasty before 65F 55M? No   Social History Narrative     Not on file       Family History -   Family History   Problem Relation Age of Onset     Heart Disease Father         MI in his 70's     Cancer Father         had kidney removed due to spot      Diabetes Paternal Grandmother      Diabetes Paternal Grandfather      Alzheimer Disease Sister 52     C.A.D. No family hx of      Breast Cancer No family hx of      Colon Cancer No family hx of        Review of Systems - As per HPI and PMHx, otherwise 7 system review of the head and neck is negative.    Physical Exam  /81   Pulse 104   Resp 18   SpO2 100%   General - The patient is in no distress. Alert and oriented to person and place, answers questions and cooperates with examination appropriately.   Voice and Breathing - The patient was breathing comfortably without the use of accessory muscles. There was no wheezing, stridor, or stertor.  The patients voice was mildly coarse.  Eyes - Extraocular movements intact.  Sclera were not icteric or injected, conjunctiva were pink and moist.  Mouth - Examination of the oral cavity showed pink, healthy oral mucosa. No lesions or ulcerations noted.  The tongue was mobile and midline.  Throat - The walls of the oropharynx were smooth, symmetric, and had no lesions or ulcerations.  The tonsillar pillars and soft palate were symmetric.  The uvula was midline on elevation. Tonsils absent. No postnasal  drainage.  Nose - External contour is symmetric, no gross deflection or scars.  Nasal mucosa is pink and moist with no abnormal mucus.  The septum was midline, some turbinate congestion. No pus or polyps.   Neck -  Soft, non-tender. Palpation of the occipital, submental, submandibular, internal jugular chain, and supraclavicular nodes did not demonstrate any abnormal lymph nodes or masses. No parotid masses. Palpation of the thyroid was soft and smooth, with no nodules or goiter appreciated.  The trachea was mobile and midline.  Neurologic - CN II-XII are grossly intact, no focal neurologic deficits.     Flexible Laryngoscopy -     Given the chief complaint, history, and physical examination, and to best visualize the airway anatomy, I proceeded with a fiberoptic examination.  Color photographs were taken for the permanent medical record. First I sprayed the right side of the nose with a mixture of lidocaine and neosynephrine.   I then passed the scope through the nasal cavity.  The nasal cavity was unremarkable. No pus or postnasal drainage. The nasopharynx was mucosally covered and symmetric.  The Eustachian tube openings were unobstructed.  Going further down I had a clear view of the base of tongue which had normal appearing lingual tonsillar tissue.  The base of tongue was free of lesions, masses, and the vallecula was open.  The epiglottis was smooth and mucosally covered.  The supraglottic larynx was then clearly visualized and was normal.  The vocal cords had mild erythema, but no lesions were seen. She has some vocal cord atrophy and presbylarynges. Full range of motion of both vocal cords. Not a lot of supraglottic squeeze. The pyriform sinuses were open, and the limited view of the postcricoid region did not show any lesions.        A/P - Deya Lee is a 66 year old female with a sore throat and hoarseness.  No evidence of infection on today's exam.  No vocal cord lesions.  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  I encouraged her to use a microphone more so she does not have to overuse her voice.  She does feel like she has voice strain when she does this.  This contributes to sore throat and hoarseness.  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  I recommend she undergo voice therapy.  She should also use lots of hydration during speaking and singing.  Return if this fails.      Moo Serrato MD  Otolaryngology  [facility]        Again, thank you for allowing me to participate in the care of your patient.        Sincerely,        Moo Serrato MD

## 2021-04-01 ENCOUNTER — HOSPITAL ENCOUNTER (OUTPATIENT)
Dept: SPEECH THERAPY | Facility: CLINIC | Age: 66
Setting detail: THERAPIES SERIES
End: 2021-04-01
Attending: OTOLARYNGOLOGY
Payer: COMMERCIAL

## 2021-04-01 DIAGNOSIS — R49.8 VOICE STRAIN: ICD-10-CM

## 2021-04-01 DIAGNOSIS — R49.0 MUSCLE TENSION DYSPHONIA: ICD-10-CM

## 2021-04-01 PROCEDURE — 92507 TX SP LANG VOICE COMM INDIV: CPT | Mod: GN | Performed by: STUDENT IN AN ORGANIZED HEALTH CARE EDUCATION/TRAINING PROGRAM

## 2021-04-01 PROCEDURE — 92524 BEHAVRAL QUALIT ANALYS VOICE: CPT | Mod: GN | Performed by: STUDENT IN AN ORGANIZED HEALTH CARE EDUCATION/TRAINING PROGRAM

## 2021-04-08 NOTE — PROGRESS NOTES
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   Surgical/Medical history reviewed Yes   Pertinent History Of Current Problem 65yo female with PMH significant for fibromyalgia, GERD, seasonal allergies, HLD, IBS, and hearing loss/tinnitus presenting with dysphonia since summer of 2020.  Symptoms initially started with a possible sinus infection in the summer, which were further worsened by a repeat sinus infection in November 2020.  Pt reports significant throat soreness with the sinus infections and after voice use.  Soreness has worsened to the point where she feels it to some degree all the time.  Throat discomfort improves with resting her voice.  Pt reports that her voice is hoarse, especially in the mornings.  She is unable to sing as high, and she reports a significant break in her singing voice between registers.  She reports feeling an irritated spot in her throat that makes her want to cough.  Drinking slightly warm water, humidifcation, and lozenges help her voice and throat.  She denies difficulty with swallowing and breathing.   Prior Level of Functioning No previous problems.   Patient Role/employment History Retired;Other/comments  (Volunteer)   General Observations Pt reports that today is a good voice day, noting that her voice can sound much worse than it does today.  She attributes her improved symptoms to currently taking Prednisone.   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  Programs are currently broadcast to residents in their rooms.  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   Evaluation Results   Voice Observations COUGH/THROAT CLEAR: intermittent hard dry throat clearing observed throughout the session.  VISIBLE TENSION: neck.  PALPATION OF THYROHYOID REGION: firm musculature with closure of the thyrohyoid space on phonation.  Pt reporting the most pain in the thyrohyoid region, but she also reports tenderness in the BOT and SCM musculature, R>L.   Voice Profile during conversation, 1 min monologue and paragraph reading   Voice Quality Raspy;Scratchy   Voice quality comments SPEECH: Consistent mild-moderate strain with intermittent mild-moderate roughness.  SINGING: HBD: mildly strained, no roughness.  Repertoire: mild strain and pitch instability.  THERAPY PROBES: improvement in voice quality with semi-occluded vocal tract and diaphragmatic engagement probes.   Voice quality severity rating continuum (1=Severe, 7=WNL) 5  (CAPE-V Overall Severity: 35/100)   Breath control Tight   Breath Control comments Excessive thoracic muscle use pattern with neck involvement on inspiration.  Poor respiratory/phonatory coordination.   Breath control severity rating continuum (1=Severe, 7=WNL) 5   Voice Use / Effort Pinched / squeezed larynx;Contraction of neck muscles;Throat push   Voice Use / Effort comments Pt rates her current phonatory effort for speech as 3/10 (10 is maximum effort), noting that effort increases to 6/10 when her symptoms are worse or is she is speaking at a louder volume.   Voice use / Effort severity rating continuum (1=Severe, 7=WNL) 5   Fundamental frequency (Hz)   (Centered around F#3)   Pitch /Frequency Description Pitch breaks   Pitch  / Frequency comments Pt reporting significant pitch/register break when singing, although this was not observed today as symptoms were less severe today.  Pitch instability and shakiness observed in sustained phonation tasks.   Pitch / Frequency severity rating continuum (1=Severe, 7=WNL) 5   Volume comments Volume for conversational speech is WFL and appropriate for the setting (1:1 conversation in quiet room).  A whisper is normal.  Soft phonation is mildly strained and rough.  Loud phonation is strained, with pt reporting increased throat pain.   Volume severity rating continuum (1=Severe, 7=WNL) 6   Neuromuscular Control Shaky   Neuromuscular Control severity rating continuum (1=Severe, 7=WNL) 6   Neuromuscular comments Pitch instability and shakiness observed in sustained phonation tasks only, especially at higher pitches.  Likely secondary to hyperfunction rather than neuromusculature control.   Resonance WNL   Resonance severity rating continuum (1=Severe, 7=WNL) 7   Comments Mild-moderate dysphonia characterized by strain, roughness, pitch instability/shakiness, poor respiratory/phonatory coordination, and increased phonatory effort with tight laryngeal musculature and neck involvement during phonation.  Pt reporting that her voice can sound worse than it does today.   Adduction /Abduction Function   Laryngeal diadokinetic speed   (WNL)   Laryngeal diadokinetic strength   (Strained)   Laryngeal diadokinetic consistency Regular;Irregular  (Intermittently irregular in rhythm)   Adduction / Abduction function scale Age 66+, norm per sec:  4   Vibratory Function of Vocal Folds   Prolonged 'ah' at mid pitch (sec) 11.0 seconds at G3 with mild strain and intermittent roughness at the end of prolongation   Prolonged 'ah' at high pitch (sec) 8.9 seconds at E4 with increased strain and a mild shaky quality   Vibratory Function of Vocal Folds Scale Females 20 - 80 yrs: 10 - 22 secs   Vibratory Function of Vocal Folds  "Comments Reduced in duration and quality   Function of Lengtheners / Shorteners (CT and TA Muscles)   Pitch glides Limited range;Upper pitches more dysphonic  (Lowest: Eb3; Highest: E5)   Videostroboscopy / Endoscopy   Other observations Laryngoscopy completed by Dr. Serrato on 3/30/2021.  Significant findings, per Epic report: \"The supraglottic larynx was then clearly visualized and was normal.  The vocal cords had mild erythema, but no lesions were seen. She has some vocal cord atrophy and presbylarynges. Full range of motion of both vocal cords. Not a lot of supraglottic squeeze. The pyriform sinuses were open, and the limited view of the postcricoid region did not show any lesions.\"   General Therapy Interventions   Planned Therapy Interventions Voice   Voice Breath flow to sound flow;Voice quality/pitch or volume tasks;Resonant voice techniques;No larynx effort practice;Larynx and TVF flexibility   Impressions and Recommendations   Communication Diagnosis Dysphonia   Summary Ms. Lee presents with mild-moderate dysphonia characterized by strain, roughness, pitch instability/shakiness, poor respiratory/phonatory coordination, and increased phonatory effort with tight laryngeal musculature and neck involvement during phonation.  Pt reporting that her voice can sound worse than it does today.  Based on today's evaluation and previous laryngoscopy with ENT, dysphonia is accounted for by a combination of vocal fold bowing, supraglottic hyperfunction, and laryngeal irritation.   Recommendations A course of skilled speech therapy is recommended in order to optimize vocal technique, improve voice quality, and promote reduced laryngeal effort, discomfort, fatigue, and irritation, so that patient is able to meet her vocal demands for speech and singing, both at home and when volunteering.   Frequency and Duration 1x/week for 6 weeks with 2-3 monthly follow-ups   Prognosis  Good with intervention   Risks and Benefits of " Treatment have been explained. Yes   Patient & /or Caregiver  in agreement with plan of care Yes   Patient Education SLP provided education regarding evaluation findings and proposed POC.  Therapy initiated today.   Educational Assessment   Barriers to Learning No barriers   Preferred Learning Style Listening;Reading;Demonstration;Pictures / Video   Voice Goals   Voice Goals 1;2;3;4   Voice Goal 1   Goal Identifier Generalization   Goal Description Patient will report a week of typical activities in which dysphonia and vocal effort do not exceed a level of 2 out of 10, 90% of the time, so that patient is able to meet her vocal demands at home and when volunteering.   Target Date 06/30/21   Voice Goal 2   Goal Identifier Speaking voice quality   Goal Description In a 20-minute speech task, patient will demonstrate strain and roughness that do not exceed a level of 2 out of 10, 90% of the time by SLP judgment, so that patient is able to meet her speaking voice quality demands.   Target Date 06/30/21   Voice Goal 3   Goal Identifier Singing voice quality   Goal Description In a 10-minute singing task, patient will demonstrate ability to phonate to E5 with roughness, breathiness, strain, and pitch instability that do not exceed a level of 2 out of 10, 90% of the time by SLP judgment, in order to improve voice quality and range for singing tasks.   Target Date 06/30/21   Voice Goal 4   Goal Identifier Massage   Goal Description Patient will learn, demonstrate, and implement use of circumlaryngeal massage exercises independently 1-2x per day, in order to promote reduced laryngeal discomfort and tension.   Target Date 06/30/21   Total Session Time   Voice Minutes (28672) 35   Total Evaluation Time 50   Therapy Certification   Certification date from 04/01/21   Certification date to 06/30/21   Medical Diagnosis Muscle tension dysphonia, Voice strain     Thank you for the referral of this patient.    Allison Alpers Ackmann,  SUSANA HAYNES (music), CCC-SLP  Speech-Language Pathologist  Certificate of Vocology  [facility]  [phone]

## 2021-04-08 NOTE — PROGRESS NOTES
[facility]          OUTPATIENT SPEECH LANGUAGE PATHOLOGY VOICE EVALUATION  PLAN OF TREATMENT FOR OUTPATIENT REHABILITATION  (COMPLETE FOR INITIAL CLAIMS ONLY)    Patient's Last Name, First Name, M.I.  YOB: 1955  Deya Lee                        Provider s Name: [facility] Medical Record No.  [id number]     Onset Date:  3/30/2021 (order date)    Start of Care Date:  4/1/2021   Type:     ___PT  __OT   _X_SLP    Medical Diagnosis: Muscle tension dysphonia, Voice strain   Speech Language Pathology Diagnosis:  Dysphonia    Visits from SOC: 1      _________________________________________________________________________________  Plan of Treatment/Functional Goals:  Voice         Goals     1. Goal Identifier: Generalization       Goal Description: Patient will report a week of typical activities in which dysphonia and vocal effort do not exceed a level of 2 out of 10, 90% of the time, so that patient is able to meet her vocal demands at home and when volunteering.       Target Date: 06/30/21   2. Goal Identifier: Speaking voice quality       Goal Description: In a 20-minute speech task, patient will demonstrate strain and roughness that do not exceed a level of 2 out of 10, 90% of the time by SLP judgment, so that patient is able to meet her speaking voice quality demands.       Target Date: 06/30/21   3. Goal Identifier: Singing voice quality       Goal Description: In a 10-minute singing task, patient will demonstrate ability to phonate to E5 with roughness, breathiness, strain, and pitch instability that do not exceed a level of 2 out of 10, 90% of the time by SLP judgment, in order to improve voice quality and range for singing tasks.       Target Date: 06/30/21   4. Goal Identifier: Massage       Goal Description: Patient will learn, demonstrate, and implement use of  circumlaryngeal massage exercises independently 1-2x per day, in order to promote reduced laryngeal discomfort and tension.       Target Date: 06/30/21            Frequency and Duration: 1x/week for 6 weeks with 2-3 monthly follow-ups  Allison E. Alpers, SLP       I CERTIFY THE NEED FOR THESE SERVICES FURNISHED UNDER        THIS PLAN OF TREATMENT AND WHILE UNDER MY CARE     (Physician co-signature of this document indicates review and certification of the therapy plan).                Certification Date From:  04/01/21  Certification Date To:  06/30/21  Referring Provider: Moo Serrato MD                 Initial Assessment        See Epic Evaluation Start of Care

## 2021-04-12 ENCOUNTER — HOSPITAL ENCOUNTER (OUTPATIENT)
Dept: SPEECH THERAPY | Facility: CLINIC | Age: 66
Setting detail: THERAPIES SERIES
End: 2021-04-12
Attending: OTOLARYNGOLOGY
Payer: COMMERCIAL

## 2021-04-12 PROCEDURE — 92507 TX SP LANG VOICE COMM INDIV: CPT | Mod: GN | Performed by: STUDENT IN AN ORGANIZED HEALTH CARE EDUCATION/TRAINING PROGRAM

## 2021-04-18 ENCOUNTER — HEALTH MAINTENANCE LETTER (OUTPATIENT)
Age: 66
End: 2021-04-18

## 2021-04-19 ENCOUNTER — HOSPITAL ENCOUNTER (OUTPATIENT)
Dept: SPEECH THERAPY | Facility: CLINIC | Age: 66
Setting detail: THERAPIES SERIES
End: 2021-04-19
Attending: OTOLARYNGOLOGY
Payer: COMMERCIAL

## 2021-04-19 PROCEDURE — 92507 TX SP LANG VOICE COMM INDIV: CPT | Mod: GN | Performed by: STUDENT IN AN ORGANIZED HEALTH CARE EDUCATION/TRAINING PROGRAM

## 2021-04-20 ENCOUNTER — TRANSFERRED RECORDS (OUTPATIENT)
Dept: HEALTH INFORMATION MANAGEMENT | Facility: CLINIC | Age: 66
End: 2021-04-20

## 2021-05-07 ENCOUNTER — TRANSFERRED RECORDS (OUTPATIENT)
Dept: HEALTH INFORMATION MANAGEMENT | Facility: CLINIC | Age: 66
End: 2021-05-07

## 2021-05-13 ENCOUNTER — VIRTUAL VISIT (OUTPATIENT)
Dept: INTERNAL MEDICINE | Facility: CLINIC | Age: 66
End: 2021-05-13
Payer: COMMERCIAL

## 2021-05-13 DIAGNOSIS — M79.7 FIBROMYALGIA AFFECTING MULTIPLE SITES: ICD-10-CM

## 2021-05-13 DIAGNOSIS — F33.42 MAJOR DEPRESSIVE DISORDER, RECURRENT EPISODE, IN FULL REMISSION (H): Primary | ICD-10-CM

## 2021-05-13 DIAGNOSIS — J01.01 ACUTE RECURRENT MAXILLARY SINUSITIS: ICD-10-CM

## 2021-05-13 DIAGNOSIS — M25.50 ARTHRALGIA, UNSPECIFIED JOINT: ICD-10-CM

## 2021-05-13 PROCEDURE — 99215 OFFICE O/P EST HI 40 MIN: CPT | Mod: 95 | Performed by: INTERNAL MEDICINE

## 2021-05-13 RX ORDER — DULOXETIN HYDROCHLORIDE 20 MG/1
20 CAPSULE, DELAYED RELEASE ORAL 2 TIMES DAILY
Qty: 30 CAPSULE | Refills: 1 | Status: SHIPPED | OUTPATIENT
Start: 2021-05-13 | End: 2021-07-14

## 2021-05-13 NOTE — PROGRESS NOTES
Deya is a 66 year old who is being evaluated via a billable video visit.      How would you like to obtain your AVS? MyChart  If the video visit is dropped, the invitation should be resent by: Text to cell phone: [phone]  Will anyone else be joining your video visit? No    Video Start Time: 2:24 PM    Assessment & Plan     Major depressive disorder, recurrent episode, in full remission (H)  Trial cymbalta at patient request.    In past she responded well to anti depressants  Hard to say, but seems like the pain is triggering her MDD and not the other way around.   - TSH with free T4 reflex; Future  - DULoxetine (CYMBALTA) 20 MG capsule; Take 1 capsule (20 mg) by mouth 2 times daily      Arthralgia, unspecified joint   she has pain all over, incidental relief during prednisone courses for sinus symptoms this past year  - CRP, inflammation; Future  - ESR: Erythrocyte sedimentation rate; Future  - Anti Nuclear Pearl IgG by IFA with Reflex; Future  -ANCA    Fibromyalgia affecting multiple sites  Her pain which started rather abruptly a couple years ago has been labeled as FMS.   Patient has exercised and kept a positive attitude: symptoms are worse and really bothersome all the time... dreadful  Incidental marked improvement during prednisone trials via ENT for sinus trouble this year  I wonder if she has seronegative inflammatory arthritis.  I will be sure to  out her ANCA tests.         43 minutes spent on the date of the encounter doing chart review, history and exam, documentation and further activities per the note            Return in about 6 months (around 11/13/2021).    Eunice Jose MD  [facility]    ==========================================  ====================================    Subjective   Deya is a 66 year old who presents for the following health issues     HPI    65 y/o F here for fibromyalgia. h/o Fibromyalgia, Lumbar djd, OAB, Kidney stones, djd .     Seen by  rheum 2019, tried meloxicam for inflammatory arthritis, later switched to gabapentin.  MRI showed djd lumbar, hips wnl.  Has upcoming rheum f/u appt...     Stress levels increased lately (social stressors, etc) and her pain has gone up a lot.   Recognizing she is having some MDD symptoms.       She does not like Rx medication... has tried to do without (hence no meloxicam)     4/20/21 MRI and hip xray reports in [place], as they were done at [facility]  also, on that imaging is a stable, .... I should consider checking for functionality if not done before.       Chief Complaint   Patient presents with     Fibromyalgia             Review of Systems   Constitutional, HEENT, cardiovascular, pulmonary, gi and gu systems are negative, except as otherwise noted.      Objective           Vitals:  No vitals were obtained today due to virtual visit.    Physical Exam   GENERAL: Healthy, alert and no distress  EYES: Eyes grossly normal to inspection.  No discharge or erythema, or obvious scleral/conjunctival abnormalities.  RESP: No audible wheeze, cough, or visible cyanosis.  No visible retractions or increased work of breathing.    SKIN: Visible skin clear. No significant rash, abnormal pigmentation or lesions.  NEURO: Cranial nerves grossly intact.  Mentation and speech appropriate for age.  PSYCH: Mentation appears normal, affect normal/bright, judgement and insight intact, normal speech and appearance well-groomed.                 Video-Visit Details    Type of service:  Video Visit    Video End Time:2:50 PM    Originating Location (pt. Location): Home    Distant Location (provider location):  [facility]     Platform used for Video Visit: [employer]

## 2021-05-13 NOTE — PATIENT INSTRUCTIONS
"Cymbalta 20 mg daily    Please schedule Labs -- I am just scouting for inflammation    With rheumatologist, discuss possibility of \"seronegative inflammatory arthritis\"???       "

## 2021-05-18 DIAGNOSIS — M79.7 FIBROMYALGIA AFFECTING MULTIPLE SITES: ICD-10-CM

## 2021-05-18 DIAGNOSIS — J01.01 ACUTE RECURRENT MAXILLARY SINUSITIS: ICD-10-CM

## 2021-05-18 DIAGNOSIS — M25.50 ARTHRALGIA, UNSPECIFIED JOINT: ICD-10-CM

## 2021-05-18 DIAGNOSIS — F33.42 MAJOR DEPRESSIVE DISORDER, RECURRENT EPISODE, IN FULL REMISSION (H): ICD-10-CM

## 2021-05-18 LAB
CRP SERPL-MCNC: <2.9 MG/L (ref 0–8)
ERYTHROCYTE [SEDIMENTATION RATE] IN BLOOD BY WESTERGREN METHOD: 14 MM/H (ref 0–30)
TSH SERPL DL<=0.005 MIU/L-ACNC: 2.01 MU/L (ref 0.4–4)

## 2021-05-18 PROCEDURE — 86255 FLUORESCENT ANTIBODY SCREEN: CPT | Performed by: INTERNAL MEDICINE

## 2021-05-18 PROCEDURE — 83516 IMMUNOASSAY NONANTIBODY: CPT | Performed by: INTERNAL MEDICINE

## 2021-05-18 PROCEDURE — 85652 RBC SED RATE AUTOMATED: CPT | Performed by: INTERNAL MEDICINE

## 2021-05-18 PROCEDURE — 36415 COLL VENOUS BLD VENIPUNCTURE: CPT | Performed by: INTERNAL MEDICINE

## 2021-05-18 PROCEDURE — 84443 ASSAY THYROID STIM HORMONE: CPT | Performed by: INTERNAL MEDICINE

## 2021-05-18 PROCEDURE — 83876 ASSAY MYELOPEROXIDASE: CPT | Performed by: INTERNAL MEDICINE

## 2021-05-18 PROCEDURE — 86140 C-REACTIVE PROTEIN: CPT | Performed by: INTERNAL MEDICINE

## 2021-05-18 PROCEDURE — 86038 ANTINUCLEAR ANTIBODIES: CPT | Performed by: INTERNAL MEDICINE

## 2021-05-19 LAB
ANA SER QL IF: NEGATIVE
ANCA AB PATTERN SER IF-IMP: NORMAL
C-ANCA TITR SER IF: NORMAL {TITER}
MYELOPEROXIDASE AB SER-ACNC: <0.2 AI (ref 0–0.9)
PROTEINASE3 IGG SER-ACNC: <0.2 AI (ref 0–0.9)

## 2021-05-19 NOTE — RESULT ENCOUNTER NOTE
Deya Lee    Labs screening for inflammatory disorder are all normal/negative.     Sincerely,       DEYVI RAMIREZ M.D.

## 2021-07-08 ENCOUNTER — NURSE TRIAGE (OUTPATIENT)
Dept: NURSING | Facility: CLINIC | Age: 66
End: 2021-07-08

## 2021-07-08 ENCOUNTER — VIRTUAL VISIT (OUTPATIENT)
Dept: URGENT CARE | Facility: CLINIC | Age: 66
End: 2021-07-08
Payer: COMMERCIAL

## 2021-07-08 DIAGNOSIS — R07.0 THROAT PAIN: Primary | ICD-10-CM

## 2021-07-08 PROCEDURE — 99213 OFFICE O/P EST LOW 20 MIN: CPT | Mod: 95

## 2021-07-08 RX ORDER — METHYLPREDNISOLONE 4 MG
TABLET, DOSE PACK ORAL
Qty: 21 TABLET | Refills: 0 | Status: SHIPPED | OUTPATIENT
Start: 2021-07-08 | End: 2022-01-07

## 2021-07-08 NOTE — PROGRESS NOTES
Deya is a 66 year old who is being evaluated via a billable video visit.      How would you like to obtain your AVS? MyChart  If the video visit is dropped, the invitation should be resent by: Text to cell phone: [phone]  Will anyone else be joining your video visit? No    Video Start Time: 12:16 PM    Assessment & Plan     Throat pain  Discussed whether to place her on Medrol dose pack or not since CT would be more beneficial  with symptoms, however she elected to start Medrol dose pack since she feels so miserable. No antibiotic given today  Will have her hold off on Meloxicam until she has completed her Medrol dose pack.   Follow up with ENT as scheduled.  Throat lozenges, salt water gargles and Tea with honey for symptom management.     - methylPREDNISolone (MEDROL DOSEPAK) 4 MG tablet therapy pack; Follow Package Directions      20 minutes spent on the date of the encounter doing chart review, patient visit and documentation          See Patient Instructions    No follow-ups on file.    Virtual Urgent Care  [facility] VIRTUAL URGENT CARE    Subjective   Deya is a 66 year old who presents for the following health issues     HPI     Severe sore throat for past week. No fever, headache. Symptoms started with cold like symptoms, cough, post nasal drainage and congestion. Has had this several times before and is seeing ENT for this very issue. Last time this happened was placed on Medrol dose pack and antibiotic. Was told if this severe ST happened again would need a CT.   Is scheduled to see ENT in one week. Was recently place on Meloxicam for her fibromyalgia by Rheumatology     Review of Systems   Constitutional, HEENT, cardiovascular, pulmonary, gi and gu systems are negative, except as otherwise noted.      Objective           Vitals:  No vitals were obtained today due to virtual visit.    Physical Exam   GENERAL: Healthy, alert and no distress  EYES: Eyes grossly normal to inspection.  No discharge  or erythema, or obvious scleral/conjunctival abnormalities.  RESP: No audible wheeze, cough, or visible cyanosis.  No visible retractions or increased work of breathing.    SKIN: Visible skin clear. No significant rash, abnormal pigmentation or lesions.  NEURO: Cranial nerves grossly intact.  Mentation and speech appropriate for age.  PSYCH: Mentation appears normal, affect normal/bright, judgement and insight intact, normal speech and appearance well-groomed.            Video-Visit Details    Type of service:  Video Visit    Video End Time:12:40 PM    Originating Location (pt. Location): Home    Distant Location (provider location):  [facility]     Platform used for Video Visit: [employer]

## 2021-07-08 NOTE — TELEPHONE ENCOUNTER
Deya has a cold that turned into a sore throat.  Cold and sore throat is present now for one week.  4th time she has this in one year.  Currently not taking prednisone.  Deya started new medication for fibromyalgia Meloxicam 15 mg.  Denies fever cough and shortness of breath.  Patient is staying hydrated.  Deya states that she has an appointment with ENT but not for one week and feels that she cannot wait until then.  Deya states that she is working with ENT Moo Serrato MD as they are trying to figure out where the sore throat is coming from.  Deya has not had a strep test for this particular sore throat.  Patient is requesting an in person clinic visit.    COVID 19 Nurse Triage Plan/Patient Instructions    Please be aware that novel coronavirus (COVID-19) may be circulating in the community. If you develop symptoms such as fever, cough, or SOB or if you have concerns about the presence of another infection including coronavirus (COVID-19), please contact your health care provider or visit [email or web address].     Disposition/Instructions    In-Person Visit with provider recommended. Reference Visit Selection Guide.    Thank you for taking steps to prevent the spread of this virus.  o Limit your contact with others.  o Wear a simple mask to cover your cough.  o Wash your hands well and often.    Resources    [facility]: About COVID-19: [email or web address]    CDC: What to Do If You're Sick: www.cdc.gov/coronavirus/2019-ncov/about/steps-when-sick.html    CDC: Ending Home Isolation: www.cdc.gov/coronavirus/2019-ncov/hcp/disposition-in-home-patients.html     CDC: Caring for Someone: www.cdc.gov/coronavirus/2019-ncov/if-you-are-sick/care-for-someone.html     [facility]: Interim Guidance for Hospital Discharge to Home: www.health.[facility].[place]/diseases/coronavirus/hcp/hospdischarge.pdf    [facility] clinical trials (COVID-19 research studies):  [email or web address]-clinical-trials     Below are the COVID-19 hotlines at the [place] Department of [facility] ([facility]). Interpreters are available.   o For health questions: Call 249-738-5377 or 1-325.170.5003 (7 a.m. to 7 p.m.)  o For questions about schools and childcare: Call 368-368-7673 or 1-110.849.2710 (7 a.m. to 7 p.m.)                       Reason for Disposition    SEVERE sore throat pain    Additional Information    Negative: Severe difficulty breathing (e.g., struggling for each breath, speaks in single words)    Negative: Sounds like a life-threatening emergency to the triager    Negative: Drooling or spitting out saliva (because can't swallow)    Negative: Unable to open mouth completely    Negative: Drinking very little and has signs of dehydration (e.g., no urine > 12 hours, very dry mouth, very lightheaded)    Negative: Patient sounds very sick or weak to the triager    Negative: Difficulty breathing (per caller) but not severe    Negative: Fever > 103 F (39.4 C)    Negative: Refuses to drink anything for > 12 hours    Protocols used: SORE THROAT-A-OH

## 2021-07-14 ENCOUNTER — OFFICE VISIT (OUTPATIENT)
Dept: OTOLARYNGOLOGY | Facility: CLINIC | Age: 66
End: 2021-07-14
Payer: COMMERCIAL

## 2021-07-14 VITALS
DIASTOLIC BLOOD PRESSURE: 72 MMHG | SYSTOLIC BLOOD PRESSURE: 132 MMHG | OXYGEN SATURATION: 99 % | RESPIRATION RATE: 16 BRPM | HEART RATE: 103 BPM

## 2021-07-14 DIAGNOSIS — R09.82 PND (POST-NASAL DRIP): ICD-10-CM

## 2021-07-14 DIAGNOSIS — R49.0 MUSCLE TENSION DYSPHONIA: Primary | ICD-10-CM

## 2021-07-14 DIAGNOSIS — J02.9 SORE THROAT: ICD-10-CM

## 2021-07-14 PROCEDURE — 99213 OFFICE O/P EST LOW 20 MIN: CPT | Performed by: OTOLARYNGOLOGY

## 2021-07-14 RX ORDER — MELOXICAM 15 MG/1
15 TABLET ORAL DAILY
COMMUNITY
End: 2022-01-07

## 2021-07-14 RX ORDER — IPRATROPIUM BROMIDE 42 UG/1
2 SPRAY, METERED NASAL 4 TIMES DAILY PRN
Qty: 15 ML | Refills: 3 | Status: SHIPPED | OUTPATIENT
Start: 2021-07-14 | End: 2022-01-07

## 2021-07-14 NOTE — LETTER
"    7/14/2021         RE: Deya Lee  [address]  [facility] [address]        Dear Colleague,    Thank you for referring your patient, Deya Lee, to the [facility]. Please see a copy of my visit note below.    Chief Complaint - sore throat recheck    History of Present Illness - Deya Lee is a 66 year old female who returns with a history of sore throat and hoarseness. Sinus symptoms have been going on intermittently for 7-8 months. I saw her 12/1/20 for sinusitis. She described symptoms of sinus congestion, sore throat as located \"all over\" in throat. She feels it is from sinus drainage. A similar thing happened intermittently since August. She went on an antibiotic twice in the past and things got better. No sinus pain or pressure. She use to have sinusitis 5-6 times a year until 40s, but then they slowed. Is getting raspy voice. She notes no relux. She takes prilosec every day. Strep test was negative. COVID was negative. No dysphagia. Nonsmoker. She had purulent postnasal drainage when I saw her last visit 12/20. She was treated with augmentin and a medrol dosepack. She got better. Culture grew no pathologic bacteria. I ordered a CT sinus, but she failed to have this done. I also saw her 3/2021. She did undergo voice therapy. It helped.     On July 3rd she came down with an upper respiratory infection. She had sore throat, drainage. She was prescribed prednisone. It helped. She wonders about fibromyalgia contributing to sore throat. She tried chloroseptic spray, it doesn't help. 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 She has been using nasal saline irrigations three days a week. She uses afrin twice daily for 20 years.     Past Medical History -   Patient Active Problem List   Diagnosis     Seasonal allergies     Personal history of kidney stones     Primary osteoarthritis of both knees     Bilateral carpal tunnel " syndrome     Early menopause     Fibromyalgia affecting multiple sites     GERD (gastroesophageal reflux disease)     Hearing loss     Hiatal hernia     HLD (hyperlipidemia)     IBS (irritable bowel syndrome)     Kidney stones     Osteoarthritis of both hips, unspecified osteoarthritis type     Osteopenia of lumbar spine     Overactive bladder     Tinnitus       Current Medications -   Current Outpatient Medications:      acetaminophen (TYLENOL) 500 MG tablet, Take 1,000 mg by mouth, Disp: , Rfl:      cetirizine (ZYRTEC) 10 MG tablet, Take 10 mg by mouth, Disp: , Rfl:      DULoxetine (CYMBALTA) 20 MG capsule, Take 1 capsule (20 mg) by mouth 2 times daily, Disp: 30 capsule, Rfl: 1     Menthol, Topical Analgesic, (BIOFREEZE) 4 % GEL, , Disp: , Rfl:      methylPREDNISolone (MEDROL DOSEPAK) 4 MG tablet therapy pack, Follow Package Directions, Disp: 21 tablet, Rfl: 0     omeprazole (PRILOSEC) 10 MG DR capsule, Take 20 mg by mouth daily, Disp: , Rfl:      oxymetazoline (AFRIN) 0.05 % nasal spray, Spray 2 sprays in nostril, Disp: , Rfl:      polyethylene glycol (MIRALAX) powder, Take by mouth daily 1 tsp, Disp: , Rfl:     Allergies -   Allergies   Allergen Reactions     Codeine Phosphate Nausea     Erythromycin Nausea     Hmg-Coa-R Inhibitors Muscle Pain (Myalgia)     Muscle aches       Social History -   Social History     Socioeconomic History     Marital status:      Spouse name: Not on file     Number of children: 1     Years of education: Not on file     Highest education level: Not on file   Occupational History     Not on file   Social Needs     Financial resource strain: Not on file     Food insecurity     Worry: Not on file     Inability: Not on file     Transportation needs     Medical: Not on file     Non-medical: Not on file   Tobacco Use     Smoking status: Never Smoker     Smokeless tobacco: Never Used   Substance and Sexual Activity     Alcohol use: No     Drug use: No     Sexual activity: Not  Currently   Lifestyle     Physical activity     Days per week: Not on file     Minutes per session: Not on file     Stress: Not on file   Relationships     Social connections     Talks on phone: Not on file     Gets together: Not on file     Attends [employer] service: Not on file     Active member of club or organization: Not on file     Attends meetings of clubs or organizations: Not on file     Relationship status: Not on file     Intimate partner violence     Fear of current or ex partner: Not on file     Emotionally abused: Not on file     Physically abused: Not on file     Forced sexual activity: Not on file   Other Topics Concern     Parent/sibling w/ CABG, MI or angioplasty before 65F 55M? No   Social History Narrative     Not on file       Family History -   Family History   Problem Relation Age of Onset     Heart Disease Father         MI in his 70's     Cancer Father         had kidney removed due to spot      Diabetes Paternal Grandmother      Diabetes Paternal Grandfather      Alzheimer Disease Sister 52     C.A.D. No family hx of      Breast Cancer No family hx of      Colon Cancer No family hx of        Review of Systems - As per HPI and PMHx, otherwise 7 system review of the head and neck is negative.    Physical Exam  /72   Pulse 103   Resp 16   SpO2 99%   General - The patient is in no distress. Alert and oriented to person and place, answers questions and cooperates with examination appropriately.   Voice and Breathing - The patient was breathing comfortably without the use of accessory muscles. There was no wheezing, stridor, or stertor.  The patients voice was mildly coarse.  Eyes - Extraocular movements intact.  Sclera were not icteric or injected, conjunctiva were pink and moist.  Mouth - Examination of the oral cavity showed pink, healthy oral mucosa. No lesions or ulcerations noted.  The tongue was mobile and midline.  Throat - The walls of the oropharynx were smooth, symmetric, and  had no lesions or ulcerations.  The tonsillar pillars and soft palate were symmetric.  The uvula was midline on elevation. Tonsils absent. clear postnasal drainage.  Nose - External contour is symmetric, no gross deflection or scars.  Nasal mucosa is pink and moist with no abnormal mucus.  The septum was midline, some turbinate congestion. No pus or polyps.   Neck -  Soft, non-tender. Palpation of the occipital, submental, submandibular, internal jugular chain, and supraclavicular nodes did not demonstrate any abnormal lymph nodes or masses. No parotid masses. Palpation of the thyroid was soft and smooth, with no nodules or goiter appreciated.  The trachea was mobile and midline.  Neurologic - CN II-XII are grossly intact, no focal neurologic deficits.     A/P - Deya Lee is a 66 year old female with a sore throat and hoarseness.  This has been recurrent issue.  She was good for a while following voice therapy.  There has been no evidence of infection on last visit laryngoscopy in today.  No vocal cord lesions on prior laryngoscopy. 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  However she also has a history of fibromyalgia and that could be playing a role.  She tried voice therapy and it helped. She got a cold and sore throat got worse approximately 11 days ago. No infection today.  I recommend she go back to voice exercises.  I also want her to try atrovent for postnasal drainage. Return or contact me if this fails.         Moo Serrato MD  Otolaryngology  [facility]        Again, thank you for allowing me to participate in the care of your patient.        Sincerely,        Moo Serrato MD

## 2021-07-14 NOTE — PATIENT INSTRUCTIONS
- try biotene mouthwash 2-3 times daily - swish and gargle  - can also try biotene mouth spray 2-3 times a day.  - do voice exercises  - stay hydrated  - try atrovent for nose and postnasal drainage

## 2021-07-14 NOTE — PROGRESS NOTES
"Chief Complaint - sore throat recheck    History of Present Illness - Deya Lee is a 66 year old female who returns with a history of sore throat and hoarseness. Sinus symptoms have been going on intermittently for 7-8 months. I saw her 12/1/20 for sinusitis. She described symptoms of sinus congestion, sore throat as located \"all over\" in throat. She feels it is from sinus drainage. A similar thing happened intermittently since August. She went on an antibiotic twice in the past and things got better. No sinus pain or pressure. She use to have sinusitis 5-6 times a year until 40s, but then they slowed. Is getting raspy voice. She notes no relux. She takes prilosec every day. Strep test was negative. COVID was negative. No dysphagia. Nonsmoker. She had purulent postnasal drainage when I saw her last visit 12/20. She was treated with augmentin and a medrol dosepack. She got better. Culture grew no pathologic bacteria. I ordered a CT sinus, but she failed to have this done. I also saw her 3/2021. She did undergo voice therapy. It helped.     On July 3rd she came down with an upper respiratory infection. She had sore throat, drainage. She was prescribed prednisone. It helped. She wonders about fibromyalgia contributing to sore throat. She tried chloroseptic spray, it doesn't help. 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 She has been using nasal saline irrigations three days a week. She uses afrin twice daily for 20 years.     Past Medical History -   Patient Active Problem List   Diagnosis     Seasonal allergies     Personal history of kidney stones     Primary osteoarthritis of both knees     Bilateral carpal tunnel syndrome     Early menopause     Fibromyalgia affecting multiple sites     GERD (gastroesophageal reflux disease)     Hearing loss     Hiatal hernia     HLD (hyperlipidemia)     IBS (irritable bowel syndrome)     Kidney stones     Osteoarthritis of both hips, unspecified " osteoarthritis type     Osteopenia of lumbar spine     Overactive bladder     Tinnitus       Current Medications -   Current Outpatient Medications:      acetaminophen (TYLENOL) 500 MG tablet, Take 1,000 mg by mouth, Disp: , Rfl:      cetirizine (ZYRTEC) 10 MG tablet, Take 10 mg by mouth, Disp: , Rfl:      DULoxetine (CYMBALTA) 20 MG capsule, Take 1 capsule (20 mg) by mouth 2 times daily, Disp: 30 capsule, Rfl: 1     Menthol, Topical Analgesic, (BIOFREEZE) 4 % GEL, , Disp: , Rfl:      methylPREDNISolone (MEDROL DOSEPAK) 4 MG tablet therapy pack, Follow Package Directions, Disp: 21 tablet, Rfl: 0     omeprazole (PRILOSEC) 10 MG DR capsule, Take 20 mg by mouth daily, Disp: , Rfl:      oxymetazoline (AFRIN) 0.05 % nasal spray, Spray 2 sprays in nostril, Disp: , Rfl:      polyethylene glycol (MIRALAX) powder, Take by mouth daily 1 tsp, Disp: , Rfl:     Allergies -   Allergies   Allergen Reactions     Codeine Phosphate Nausea     Erythromycin Nausea     Hmg-Coa-R Inhibitors Muscle Pain (Myalgia)     Muscle aches       Social History -   Social History     Socioeconomic History     Marital status:      Spouse name: Not on file     Number of children: 1     Years of education: Not on file     Highest education level: Not on file   Occupational History     Not on file   Social Needs     Financial resource strain: Not on file     Food insecurity     Worry: Not on file     Inability: Not on file     Transportation needs     Medical: Not on file     Non-medical: Not on file   Tobacco Use     Smoking status: Never Smoker     Smokeless tobacco: Never Used   Substance and Sexual Activity     Alcohol use: No     Drug use: No     Sexual activity: Not Currently   Lifestyle     Physical activity     Days per week: Not on file     Minutes per session: Not on file     Stress: Not on file   Relationships     Social connections     Talks on phone: Not on file     Gets together: Not on file     Attends [employer] service: Not on  file     Active member of club or organization: Not on file     Attends meetings of clubs or organizations: Not on file     Relationship status: Not on file     Intimate partner violence     Fear of current or ex partner: Not on file     Emotionally abused: Not on file     Physically abused: Not on file     Forced sexual activity: Not on file   Other Topics Concern     Parent/sibling w/ CABG, MI or angioplasty before 65F 55M? No   Social History Narrative     Not on file       Family History -   Family History   Problem Relation Age of Onset     Heart Disease Father         MI in his 70's     Cancer Father         had kidney removed due to spot      Diabetes Paternal Grandmother      Diabetes Paternal Grandfather      Alzheimer Disease Sister 52     C.A.D. No family hx of      Breast Cancer No family hx of      Colon Cancer No family hx of        Review of Systems - As per HPI and PMHx, otherwise 7 system review of the head and neck is negative.    Physical Exam  /72   Pulse 103   Resp 16   SpO2 99%   General - The patient is in no distress. Alert and oriented to person and place, answers questions and cooperates with examination appropriately.   Voice and Breathing - The patient was breathing comfortably without the use of accessory muscles. There was no wheezing, stridor, or stertor.  The patients voice was mildly coarse.  Eyes - Extraocular movements intact.  Sclera were not icteric or injected, conjunctiva were pink and moist.  Mouth - Examination of the oral cavity showed pink, healthy oral mucosa. No lesions or ulcerations noted.  The tongue was mobile and midline.  Throat - The walls of the oropharynx were smooth, symmetric, and had no lesions or ulcerations.  The tonsillar pillars and soft palate were symmetric.  The uvula was midline on elevation. Tonsils absent. clear postnasal drainage.  Nose - External contour is symmetric, no gross deflection or scars.  Nasal mucosa is pink and moist with no  abnormal mucus.  The septum was midline, some turbinate congestion. No pus or polyps.   Neck -  Soft, non-tender. Palpation of the occipital, submental, submandibular, internal jugular chain, and supraclavicular nodes did not demonstrate any abnormal lymph nodes or masses. No parotid masses. Palpation of the thyroid was soft and smooth, with no nodules or goiter appreciated.  The trachea was mobile and midline.  Neurologic - CN II-XII are grossly intact, no focal neurologic deficits.     A/P - Deya Lee is a 66 year old female with a sore throat and hoarseness.  This has been recurrent issue.  She was good for a while following voice therapy.  There has been no evidence of infection on last visit laryngoscopy in today.  No vocal cord lesions on prior laryngoscopy. 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  However she also has a history of fibromyalgia and that could be playing a role.  She tried voice therapy and it helped. She got a cold and sore throat got worse approximately 11 days ago. No infection today.  I recommend she go back to voice exercises.  I also want her to try atrovent for postnasal drainage. Return or contact me if this fails.         Moo Serrato MD  Otolaryngology  [facility]

## 2021-09-19 ENCOUNTER — MYC MEDICAL ADVICE (OUTPATIENT)
Dept: INTERNAL MEDICINE | Facility: CLINIC | Age: 66
End: 2021-09-19

## 2021-10-02 ENCOUNTER — HEALTH MAINTENANCE LETTER (OUTPATIENT)
Age: 66
End: 2021-10-02

## 2021-11-04 ENCOUNTER — ANCILLARY PROCEDURE (OUTPATIENT)
Dept: MAMMOGRAPHY | Facility: CLINIC | Age: 66
End: 2021-11-04
Attending: INTERNAL MEDICINE
Payer: COMMERCIAL

## 2021-11-04 DIAGNOSIS — Z12.31 VISIT FOR SCREENING MAMMOGRAM: ICD-10-CM

## 2021-11-04 PROCEDURE — 77067 SCR MAMMO BI INCL CAD: CPT | Mod: TC | Performed by: RADIOLOGY

## 2022-01-04 ENCOUNTER — E-VISIT (OUTPATIENT)
Dept: URGENT CARE | Facility: URGENT CARE | Age: 67
End: 2022-01-04
Payer: COMMERCIAL

## 2022-01-04 DIAGNOSIS — Z20.822 SUSPECTED COVID-19 VIRUS INFECTION: ICD-10-CM

## 2022-01-04 DIAGNOSIS — J02.9 SORE THROAT: ICD-10-CM

## 2022-01-04 PROCEDURE — 99421 OL DIG E/M SVC 5-10 MIN: CPT | Performed by: PHYSICIAN ASSISTANT

## 2022-01-04 NOTE — PATIENT INSTRUCTIONS
Deya,    Your symptoms show that you may have coronavirus (COVID-19). This illness can cause fever, cough and trouble breathing. Many people get a mild case and get better on their own. Some people can get very sick.    Because you also reported sore throat, I would like to also test you for Strep Throat to determine if we need to treat you for that as well.    What should I do?  We would like to test you for COVID-19 virus and Strep Throat. I have placed orders for these tests. To schedule: go to your [facility] home page and scroll down to the section that says  You have an appointment that needs to be scheduled  and click the large green button that says  Schedule Now  and follow the steps to find the next available openings. It is important that when you are asked what the reason for your appointment is that you mention you need BOTH COVID and Strep tests.    If you are unable to complete these [facility] scheduling steps, please call [phone] to schedule your testing.     Return to work/school/ guidance:   Please let your workplace manager and staffing office know when your isolation ends.       If you receive a positive COVID-19 test result, follow the guidance of the those who are giving you the results. Usually the return to work is 10 days from symptom onset or positive test date (or in some cases 20 days if you are immunocompromised). If your symptoms started after your positive test, the 10 days should start when your symptoms started.   o If you work at [facility] [employer] [place], you must also be cleared by Employee Occupational Health and Safety to return to work.      If you receive a negative COVID-19 test result and did not have a high risk exposure to someone with a known positive COVID-19 test, you can return to work once you're free of fever for 24 hours without fever-reducing medication and your symptoms are improving or resolved.    If you receive a negative COVID-19 test and if you had a high  risk exposure to someone who has tested positive for COVID-19 then you can return to work 14 days after your last contact with the positive individual. Follow quarantine guidance given by your doctor or public health officials.    Sign up for [employer].   We know it's scary to hear that you might have COVID-19. We want to track your symptoms to make sure you're okay over the next 2 weeks. Please look for an email from [employer]--this is a free, online program that we'll use to keep in touch. To sign up, follow the link in the email you will receive. Learn more at [email or web address]    How can I take care of myself?  Over the counter medications may help with your symptoms like congestion, cough, chills, or fever.    There are not many effective prescription treatments for early COVID-19. Hydroxychloroquine, ivermectin, and azithromycin are not effective or recommended for COVID-19.    If your symptoms started in the last 10 days, you may be able to receive a treatment with monoclonal antibodies. This treatment can lower your risk of severe illness and going to the hospital. It is given through an IV or under your skin (subcutaneous) and must be given at an infusion center. You must be 12 or older, weight at least 88 pounds, and have a positive COVID-19 test.      If you would like to sign up to be considered to receive the monoclonal antibody medicine, please complete a participation form through the [facility] here: MNRAP ([email or web address]). You may also call the [facility] COVID-19 Public Hotline at 1-234.730.1786 (open Mon-Fri: 9am-7pm and Sat: 10am-6pm).     Not all people who are eligible will receive the medicine, since supply is limited. You will be contacted in the next 1 to 2 business days only if you are selected. If you do not receive a call, you have not been selected to receive the medicine. If you have any questions about  this medication, please contact your primary care provider. For more information, see [email or web address]      Get lots of rest. Drink extra fluids (unless a doctor has told you not to)    Take Tylenol (acetaminophen) or ibuprofen for fever or pain. If you have liver or kidney problems, ask your family doctor if it's okay to take Tylenol or ibuprofen    Take over the counter medications for your symptoms, as directed by your doctor. You may also talk to your pharmacist.      If you have other health problems (like cancer, heart failure, an organ transplant or severe kidney disease): Call your specialty clinic if you don't feel better in the next 2 days.    Know when to call 911. Emergency warning signs include:  o Trouble breathing or shortness of breath  o Pain or pressure in the chest that doesn't go away  o Feeling confused like you haven't felt before, or not being able to wake up  o Bluish-colored lips or face    Where can I get more information?    [facility] - About COVID-19: [email or web address]     CDC - What to Do If You're Sick:   www.cdc.gov/coronavirus/2019-ncov/about/steps-when-sick.html    CDC - Ending Home Isolation:  https://www.cdc.gov/coronavirus/2019-ncov/your-health/quarantine-isolation.html    CDC - Caring for Someone:  www.cdc.gov/coronavirus/2019-ncov/if-you-are-sick/care-for-someone.html    [facility] clinical trials (COVID-19 research studies): clinicalaffairs.[facility]/n-clinical-trials    Below are the COVID-19 hotlines at the [facility] of Health ([facility]). Interpreters are available.  o For health questions: Call 631-781-5943 or 1-185.837.4519 (7 a.m. to 7 p.m.)  o For questions about schools and childcare: Call 613-542-2492 or 1-724.316.6724 (7 a.m. to 7 p.m.)  January 4, 2022  RE:  Deya Lee                                                                                                                   [address]   [facility] [address]      To whom it may concern:    I evaluated Deya Lee on January 4, 2022. Deya Lee should be excused from work/school.     They should let their workplace manager and staffing office know when their quarantine ends.    We can not give an exact date as it depends on the information below. They can calculate this on their own or work with their manager/staffing office to calculate this. (For example if they were exposed on 10/04, they would have to quarantine for 14 full days. That would be through 10/18. They could return on 10/19.)    Quarantine Guidelines:    If patient receives a positive COVID-19 test result, they should follow the guidance of those who are giving the results. Usually the return to work is 10 (or in some cases 20 days from symptom onset.) If they work at [employer], they must be cleared by Employee Occupational Health and Safety to return to work.      If patient receives a negative COVID-19 test result and did not have a high risk exposure to someone with a known positive COVID-19 test, they can return to work once they're free of fever for 24 hours without fever-reducing medication and their symptoms are improving or resolved.    If patient receives a negative COVID-19 test and if they had a high risk exposure to someone who has tested positive for COVID-19 then they can return to work 14 days after their last contact with the positive individual    Note: If there is ongoing exposure to the covid positive person, this quarantine period may be longer than 14 days. (For example, if they are continually exposed to their child, who tested positive and cannot isolate from them, then the quarantine of 7-14 days can't start until their child is no longer contagious. This is typically 10 days from onset to the child's symptoms. So the total duration may be 17-24 days in this case.)     Sincerely,  Fredy Cline,  MOHAMUD          o

## 2022-01-05 ENCOUNTER — LAB (OUTPATIENT)
Dept: FAMILY MEDICINE | Facility: CLINIC | Age: 67
End: 2022-01-05
Attending: PHYSICIAN ASSISTANT
Payer: COMMERCIAL

## 2022-01-05 DIAGNOSIS — J02.9 SORE THROAT: ICD-10-CM

## 2022-01-05 DIAGNOSIS — Z20.822 SUSPECTED COVID-19 VIRUS INFECTION: ICD-10-CM

## 2022-01-05 LAB
DEPRECATED S PYO AG THROAT QL EIA: NEGATIVE
GROUP A STREP BY PCR: NOT DETECTED

## 2022-01-05 PROCEDURE — U0003 INFECTIOUS AGENT DETECTION BY NUCLEIC ACID (DNA OR RNA); SEVERE ACUTE RESPIRATORY SYNDROME CORONAVIRUS 2 (SARS-COV-2) (CORONAVIRUS DISEASE [COVID-19]), AMPLIFIED PROBE TECHNIQUE, MAKING USE OF HIGH THROUGHPUT TECHNOLOGIES AS DESCRIBED BY CMS-2020-01-R: HCPCS

## 2022-01-05 PROCEDURE — 87651 STREP A DNA AMP PROBE: CPT

## 2022-01-05 PROCEDURE — U0005 INFEC AGEN DETEC AMPLI PROBE: HCPCS

## 2022-01-06 LAB — SARS-COV-2 RNA RESP QL NAA+PROBE: NEGATIVE

## 2022-01-07 ENCOUNTER — E-VISIT (OUTPATIENT)
Dept: INTERNAL MEDICINE | Facility: CLINIC | Age: 67
End: 2022-01-07
Payer: COMMERCIAL

## 2022-01-07 DIAGNOSIS — J01.90 ACUTE SINUSITIS WITH SYMPTOMS > 10 DAYS: Primary | ICD-10-CM

## 2022-01-07 PROCEDURE — 99421 OL DIG E/M SVC 5-10 MIN: CPT | Performed by: INTERNAL MEDICINE

## 2022-01-07 RX ORDER — DOXYCYCLINE HYCLATE 100 MG
100 TABLET ORAL 2 TIMES DAILY
Qty: 14 TABLET | Refills: 0 | Status: SHIPPED | OUTPATIENT
Start: 2022-01-07 | End: 2022-01-14

## 2022-01-07 NOTE — PATIENT INSTRUCTIONS
Sinusitis (Antibiotic Treatment)    The sinuses are air-filled spaces within the bones of the face. They connect to the inside of the nose. Sinusitis is an inflammation of the tissue that lines the sinuses. Sinusitis can occur during a cold. It can also happen due to allergies to pollens and other particles in the air. Sinusitis can cause symptoms of sinus congestion and a feeling of fullness. A sinus infection causes fever, headache, and facial pain. There is often green or yellow fluid draining from the nose or into the back of the throat (post-nasal drip). You have been given antibiotics to treat this condition.   Home care    Take the full course of antibiotics as instructed. Don't stop taking them, even when you feel better.    Drink plenty of water, hot tea, and other liquids as directed by the healthcare provider. This may help thin nasal mucus. It also may help your sinuses drain fluids.    Heat may help soothe painful areas of your face. Use a towel soaked in hot water. Or,  the shower and direct the warm spray onto your face. Using a vaporizer along with a menthol rub at night may also help soothe symptoms.     An expectorant with guaifenesin may help thin nasal mucus and help your sinuses drain fluids. Talk with your provider or pharmacists before taking an over-the-counter (OTC) medicine if you have any questions about it or its side effects..    You can use an OTC decongestant, unless a similar medicine was prescribed to you. Nasal sprays work the fastest. Use one that contains phenylephrine or oxymetazoline. First blow your nose gently. Then use the spray. Don't use these medicines more often than directed on the label. If you do, your symptoms may get worse. You may also take pills that contain pseudoephedrine. Don t use products that combine multiple medicines. This is because side effects may be increased. Read labels. You can also ask the pharmacist for help. (People with high blood  pressure should not use decongestants. They can raise blood pressure.) Talk with your provider or pharmacist if you have any questions about the medicine..    OTC antihistamines may help if allergies contributed to your sinusitis. Talk with your provider or pharmacist if you have any questions about the medicine..    Don't use nasal rinses or irrigation during an acute sinus infection, unless your healthcare provider tells you to. Rinsing may spread the infection to other areas in your sinuses.    Use acetaminophen or ibuprofen to control pain, unless another pain medicine was prescribed to you. If you have chronic liver or kidney disease or ever had a stomach ulcer, talk with your healthcare provider before using these medicines. Never give aspirin to anyone under age 18 who is ill with a fever. It may cause severe liver damage.    Don't smoke. This can make symptoms worse.    Follow-up care  Follow up with your healthcare provider, or as advised.   When to seek medical advice  Call your healthcare provider if any of these occur:     Facial pain or headache that gets worse    Stiff neck    Unusual drowsiness or confusion    Swelling of your forehead or eyelids    Symptoms don't go away in 10 days    Vision problems, such as blurred or double vision    Fever of 100.4 F (38 C) or higher, or as directed by your healthcare provider  Call 911  Call 911 if any of these occur:     Seizure    Trouble breathing    Feeling dizzy or faint    Fingernails, skin or lips look blue, purple , or gray  Prevention  Here are steps you can take to help prevent an infection:     Keep good hand washing habits.    Don t have close contact with people who have sore throats, colds, or other upper respiratory infections.    Don t smoke, and stay away from secondhand smoke.    Stay up to date with of your vaccines.  [employer] last reviewed this educational content on 12/1/2019 2000-2021 [employer]. All rights reserved. This  information is not intended as a substitute for professional medical care. Always follow your healthcare professional's instructions.        Dear Deya Lee    After reviewing your responses, I've been able to diagnose you with?a sinus infection caused by bacteria.?     Based on your responses and diagnosis, I have prescribed doxycyclinr to treat your symptoms. I have sent this to your pharmacy.?       You COULD wait to start this antibiotic -- if symptoms still present and not improving over weekend, then go ahead and start.     It is also important to stay well hydrated, get lots of rest and take over-the-counter decongestants,?tylenol?or ibuprofen if you?are able to?take those medications per your primary care provider to help relieve discomfort.?     It is important that you take?all of?your prescribed medication even if your symptoms are improving after a few doses.? Taking?all of?your medicine helps prevent the symptoms from returning.?     If your symptoms worsen, you develop severe headache, vomiting, high fever (>102), or are not improving in 7 days, please contact your primary care provider for an appointment or visit any of our convenient Walk-in Care or Urgent Care Centers to be seen which can be found on our website?here.?     Thanks again for choosing?us?as your health care partner,?   ?  Eunice Jose MD?

## 2022-01-18 NOTE — PATIENT INSTRUCTIONS
Follow up with ENT next week as scheduled    Do not take Meloxicam while on the Medrol dose pack    Salt water gargles, tea with honey and throat lozenges for symptom management.        Incidentally COVID+. Vaccinated with J&J. On RA. CXR clear.  D-dimer 164>157>242  Ferritin 63>71  CRP 7.7>4.8  s/p MAB  Oxygen sat 96-99% on RA

## 2022-05-14 ENCOUNTER — HEALTH MAINTENANCE LETTER (OUTPATIENT)
Age: 67
End: 2022-05-14

## 2022-09-03 ENCOUNTER — HEALTH MAINTENANCE LETTER (OUTPATIENT)
Age: 67
End: 2022-09-03

## 2022-11-29 ENCOUNTER — IMMUNIZATION (OUTPATIENT)
Dept: FAMILY MEDICINE | Facility: CLINIC | Age: 67
End: 2022-11-29
Payer: COMMERCIAL

## 2022-11-29 DIAGNOSIS — Z23 HIGH PRIORITY FOR 2019-NCOV VACCINE: ICD-10-CM

## 2022-11-29 DIAGNOSIS — Z23 NEED FOR PROPHYLACTIC VACCINATION AND INOCULATION AGAINST INFLUENZA: ICD-10-CM

## 2022-11-29 PROCEDURE — 91312 COVID-19 VACCINE BIVALENT BOOSTER 12+ (PFIZER): CPT

## 2022-11-29 PROCEDURE — 90662 IIV NO PRSV INCREASED AG IM: CPT

## 2022-11-29 PROCEDURE — G0008 ADMIN INFLUENZA VIRUS VAC: HCPCS

## 2022-11-29 PROCEDURE — 99207 PR NO CHARGE LOS: CPT

## 2022-11-29 PROCEDURE — 0124A COVID-19 VACCINE BIVALENT BOOSTER 12+ (PFIZER): CPT

## 2023-01-02 ENCOUNTER — ANCILLARY PROCEDURE (OUTPATIENT)
Dept: MAMMOGRAPHY | Facility: CLINIC | Age: 68
End: 2023-01-02
Attending: INTERNAL MEDICINE
Payer: COMMERCIAL

## 2023-01-02 DIAGNOSIS — Z12.31 VISIT FOR SCREENING MAMMOGRAM: ICD-10-CM

## 2023-01-02 PROCEDURE — 77067 SCR MAMMO BI INCL CAD: CPT | Mod: TC | Performed by: RADIOLOGY

## 2023-02-14 ENCOUNTER — TRANSFERRED RECORDS (OUTPATIENT)
Dept: HEALTH INFORMATION MANAGEMENT | Facility: CLINIC | Age: 68
End: 2023-02-14
Payer: COMMERCIAL

## 2023-06-03 ENCOUNTER — HEALTH MAINTENANCE LETTER (OUTPATIENT)
Age: 68
End: 2023-06-03

## 2024-02-12 ENCOUNTER — OFFICE VISIT (OUTPATIENT)
Dept: FAMILY MEDICINE | Facility: CLINIC | Age: 69
End: 2024-02-12
Payer: COMMERCIAL

## 2024-02-12 VITALS
SYSTOLIC BLOOD PRESSURE: 134 MMHG | OXYGEN SATURATION: 100 % | BODY MASS INDEX: 25.3 KG/M2 | HEIGHT: 66 IN | RESPIRATION RATE: 16 BRPM | TEMPERATURE: 98.4 F | HEART RATE: 110 BPM | DIASTOLIC BLOOD PRESSURE: 80 MMHG | WEIGHT: 157.4 LBS

## 2024-02-12 DIAGNOSIS — S29.011A MUSCLE STRAIN OF CHEST WALL, INITIAL ENCOUNTER: ICD-10-CM

## 2024-02-12 DIAGNOSIS — Z28.21 VACCINATION DECLINED BY PATIENT: ICD-10-CM

## 2024-02-12 DIAGNOSIS — R09.81 NASAL CONGESTION: ICD-10-CM

## 2024-02-12 DIAGNOSIS — M54.2 NECK PAIN: Primary | ICD-10-CM

## 2024-02-12 PROCEDURE — 99214 OFFICE O/P EST MOD 30 MIN: CPT | Performed by: FAMILY MEDICINE

## 2024-02-12 RX ORDER — RESPIRATORY SYNCYTIAL VIRUS VACCINE 120MCG/0.5
0.5 KIT INTRAMUSCULAR ONCE
Qty: 1 EACH | Refills: 0 | Status: CANCELLED | OUTPATIENT
Start: 2024-02-12 | End: 2024-02-12

## 2024-02-12 RX ORDER — METHYLPREDNISOLONE 4 MG
TABLET, DOSE PACK ORAL
Qty: 21 TABLET | Refills: 0 | Status: SHIPPED | OUTPATIENT
Start: 2024-02-12 | End: 2024-03-06

## 2024-02-12 ASSESSMENT — PATIENT HEALTH QUESTIONNAIRE - PHQ9
SUM OF ALL RESPONSES TO PHQ QUESTIONS 1-9: 3
10. IF YOU CHECKED OFF ANY PROBLEMS, HOW DIFFICULT HAVE THESE PROBLEMS MADE IT FOR YOU TO DO YOUR WORK, TAKE CARE OF THINGS AT HOME, OR GET ALONG WITH OTHER PEOPLE: SOMEWHAT DIFFICULT

## 2024-02-12 ASSESSMENT — PAIN SCALES - GENERAL: PAINLEVEL: SEVERE PAIN (6)

## 2024-02-12 NOTE — PROGRESS NOTES
"  Assessment & Plan this was a new patient to me today    Neck pain    The patient has a history of C4-C5 fusion.  The pain she is having and her left shoulder is similar to the pain she had prior to her fusion.  I let her know above and below fusion that levels work harder.  Will do a Medrol Dosepak and if not improved would recommend an MRI.    - methylPREDNISolone (MEDROL DOSEPAK) 4 MG tablet therapy pack; Follow Package Directions    Muscle strain of chest wall, initial encounter  The patient has tenderness over the left pectoralis minor.  I have shown her how to stretch this.  I do not recommend returning to the chiropractor    Nasal congestion  The patient was treated with a Medrol Dosepak for severe nasal congestion by ENT in the past.  Since she needs a Medrol Dosepak for her neck pain we will start with this and if she is not seeing improvement in the next 3 to 4 days could add an antibiotic.    Vaccination declined by patient      30 minutes spent by me on the date of the encounter doing chart review, history and exam, documentation and further activities per the note      BMI  Estimated body mass index is 25.41 kg/m  as calculated from the following:    Height as of this encounter: 1.676 m (5' 6\").    Weight as of this encounter: 71.4 kg (157 lb 6.4 oz).         If not improving    Subjective   Deya is a 68 year old, presenting for the following health issues:  Nasal Congestion        2/12/2024     1:05 PM   Additional Questions   Roomed by Smita YANEZ   Accompanied by self         2/12/2024     1:05 PM   Patient Reported Additional Medications   Patient reports taking the following new medications none     History of Present Illness       Reason for visit:  Nasal draining AND muscle strain and/or pinched nerve  Symptom onset:  1-2 weeks ago  Symptoms include:  See above  Symptom intensity:  Severe  Symptom progression:  Staying the same  Had these symptoms before:  Yes  Has tried/received treatment for " "these symptoms:  Yes  Previous treatment was successful:  Yes  Prior treatment description:  5 pack prednisone for nasal/had surgery for pinched nerve 13 years ago  What makes it worse:  Overuse  What makes it better:  Slightly - ibuprofen, ice, heat    She eats 2-3 servings of fruits and vegetables daily.She consumes 0 sweetened beverage(s) daily.She exercises with enough effort to increase her heart rate 30 to 60 minutes per day.  She exercises with enough effort to increase her heart rate 6 days per week.   She is taking medications regularly.     -- Had covid about 3 weeks ago- treated with Paxlovid but has continued to have nasal congestion/drainage.  She has a history of acute sinusitis when she was younger.      -- History of right shoulder pinched nerve -- has been going to Chiro and thinks they did something- now having some left shoulder pain.  She has fibromyalgia.  She is not on prescription medications for this.  The pain is mosttly in the left lateral chest under the left arm.   She is having occasional pain into her left shoulder blade similar to the right sided pinched nerve in the past.  The chiropractor cracked her neck.       She says she had a pinched nerve 13 years ago affecting her right arm an upper back.  They fused C4-C5.            Review of Systems  Constitutional, HEENT, cardiovascular, pulmonary, gi and gu systems are negative, except as otherwise noted.      Objective    /80 (BP Location: Right arm, Patient Position: Sitting, Cuff Size: Adult Regular)   Pulse 110   Temp 98.4  F (36.9  C) (Oral)   Resp 16   Ht 1.676 m (5' 6\")   Wt 71.4 kg (157 lb 6.4 oz)   SpO2 100%   BMI 25.41 kg/m    Body mass index is 25.41 kg/m .  Physical Exam   GENERAL: alert and no distress  EYES: Eyes grossly normal to inspection, PERRL and conjunctivae and sclerae normal  HENT: normal cephalic/atraumatic, ear canals and TM's normal, nasal mucosa edematous , oropharynx clear, and oral mucous " membranes moist  NECK: no adenopathy, no asymmetry, masses, or scars  RESP: lungs clear to auscultation - no rales, rhonchi or wheezes  CV: regular rate and rhythm, normal S1 S2, no S3 or S4, no murmur, click or rub, no peripheral edema  MS: tenderness to palpation left pectoralis minor and neck exam shows no torticollis and ROM is normal  SKIN: no suspicious lesions or rashes  PSYCH: mentation appears normal, affect normal/bright            Signed Electronically by: Chelo Carney, DO

## 2024-02-13 ENCOUNTER — MYC MEDICAL ADVICE (OUTPATIENT)
Dept: FAMILY MEDICINE | Facility: CLINIC | Age: 69
End: 2024-02-13

## 2024-02-13 DIAGNOSIS — Z13.1 SCREENING FOR DIABETES MELLITUS: Primary | ICD-10-CM

## 2024-02-13 NOTE — TELEPHONE ENCOUNTER
Routing My Chart message to Dr Carney    Patient would like labs drawn for diabetes.    HGB A1c pended.  Anything more?    Last office visit 2/11    Onur Means, RN, BSN, PHN  [facility]

## 2024-02-15 ENCOUNTER — LAB (OUTPATIENT)
Dept: LAB | Facility: CLINIC | Age: 69
End: 2024-02-15
Payer: COMMERCIAL

## 2024-02-15 DIAGNOSIS — E78.5 HLD (HYPERLIPIDEMIA): Primary | ICD-10-CM

## 2024-02-15 DIAGNOSIS — Z13.1 SCREENING FOR DIABETES MELLITUS: ICD-10-CM

## 2024-02-15 LAB
ANION GAP SERPL CALCULATED.3IONS-SCNC: 12 MMOL/L (ref 7–15)
BUN SERPL-MCNC: 16.7 MG/DL (ref 8–23)
CALCIUM SERPL-MCNC: 9.5 MG/DL (ref 8.8–10.2)
CHLORIDE SERPL-SCNC: 98 MMOL/L (ref 98–107)
CHOLEST SERPL-MCNC: 234 MG/DL
CREAT SERPL-MCNC: 0.83 MG/DL (ref 0.51–0.95)
DEPRECATED HCO3 PLAS-SCNC: 26 MMOL/L (ref 22–29)
EGFRCR SERPLBLD CKD-EPI 2021: 76 ML/MIN/1.73M2
FASTING STATUS PATIENT QL REPORTED: YES
FASTING STATUS PATIENT QL REPORTED: YES
GLUCOSE SERPL-MCNC: 90 MG/DL (ref 70–99)
GLUCOSE SERPL-MCNC: 90 MG/DL (ref 70–99)
HDLC SERPL-MCNC: 41 MG/DL
LDLC SERPL CALC-MCNC: 159 MG/DL
NONHDLC SERPL-MCNC: 193 MG/DL
POTASSIUM SERPL-SCNC: 4.4 MMOL/L (ref 3.4–5.3)
SODIUM SERPL-SCNC: 136 MMOL/L (ref 135–145)
TRIGL SERPL-MCNC: 171 MG/DL

## 2024-02-15 PROCEDURE — 80048 BASIC METABOLIC PNL TOTAL CA: CPT

## 2024-02-15 PROCEDURE — 36415 COLL VENOUS BLD VENIPUNCTURE: CPT

## 2024-02-15 PROCEDURE — 80061 LIPID PANEL: CPT

## 2024-02-16 ENCOUNTER — TELEPHONE (OUTPATIENT)
Dept: FAMILY MEDICINE | Facility: CLINIC | Age: 69
End: 2024-02-16
Payer: COMMERCIAL

## 2024-02-16 DIAGNOSIS — E78.5 DYSLIPIDEMIA: Primary | ICD-10-CM

## 2024-02-16 RX ORDER — SIMVASTATIN 20 MG
20 TABLET ORAL AT BEDTIME
Qty: 30 TABLET | Refills: 11 | Status: SHIPPED | OUTPATIENT
Start: 2024-02-16 | End: 2024-03-07

## 2024-02-16 NOTE — TELEPHONE ENCOUNTER
Routing Passenger [employer] Xpres[employer] message to provider as FYI.    Patient is not going to take the Simvastatin that was ordered.    Christine M Klisch, RN

## 2024-02-16 NOTE — TELEPHONE ENCOUNTER
Daniel Falk,    Your labs were normal except your cholesterol was high.  You should limit the amount of saturated fat you eat.  This includes meat, cheese, ice cream, fast foods and processed foods.  Below is your risk for a heart attack in the next 10 years.  It is greater than 7 % so a medication to reduce your cholesterol is recommended at this time.  I have called you in simvastatin to the pharmacy we have for you on file. This should be rechecked in 3-6 months.  Feel free to email or call if you have any questions.    Have a nice day.    Chelo Carney D.O.    The 10-year ASCVD risk score (Suzan MADDEN, et al., 2019) is: 10.5%    Values used to calculate the score:      Age: 68 years      Sex: Female      Is Non- : No      Diabetic: No      Tobacco smoker: No      Systolic Blood Pressure: 134 mmHg      Is BP treated: No      HDL Cholesterol: 41 mg/dL      Total Cholesterol: 234 mg/dL    Will have the patient retry statin if she is willing

## 2024-03-06 ENCOUNTER — OFFICE VISIT (OUTPATIENT)
Dept: FAMILY MEDICINE | Facility: CLINIC | Age: 69
End: 2024-03-06
Payer: COMMERCIAL

## 2024-03-06 VITALS
SYSTOLIC BLOOD PRESSURE: 137 MMHG | OXYGEN SATURATION: 99 % | WEIGHT: 157.4 LBS | DIASTOLIC BLOOD PRESSURE: 84 MMHG | HEART RATE: 110 BPM | TEMPERATURE: 97.6 F | BODY MASS INDEX: 25.41 KG/M2

## 2024-03-06 DIAGNOSIS — J02.9 SORE THROAT: Primary | ICD-10-CM

## 2024-03-06 LAB — GROUP A STREP BY PCR: NOT DETECTED

## 2024-03-06 PROCEDURE — 99213 OFFICE O/P EST LOW 20 MIN: CPT

## 2024-03-06 PROCEDURE — 87651 STREP A DNA AMP PROBE: CPT

## 2024-03-06 RX ORDER — RESPIRATORY SYNCYTIAL VIRUS VACCINE 120MCG/0.5
0.5 KIT INTRAMUSCULAR ONCE
Qty: 1 EACH | Refills: 0 | Status: CANCELLED | OUTPATIENT
Start: 2024-03-06 | End: 2024-03-06

## 2024-03-06 RX ORDER — BENZONATATE 200 MG/1
200 CAPSULE ORAL 3 TIMES DAILY PRN
Qty: 30 CAPSULE | Refills: 0 | Status: SHIPPED | OUTPATIENT
Start: 2024-03-06 | End: 2024-09-30

## 2024-03-06 ASSESSMENT — ENCOUNTER SYMPTOMS: COUGH: 1

## 2024-03-06 NOTE — PROGRESS NOTES
Assessment & Plan     Sore throat  Symptoms most likely related to viral infection. Patient declined testing for COVID, Influenza and RSV. Tessalon ordered for cough. Strep test ordered for evaluation of strep. Supportive measures encouraged.   - benzonatate (TESSALON) 200 MG capsule; Take 1 capsule (200 mg) by mouth 3 times daily as needed for cough  - Group A Streptococcus PCR Throat Swab                  Subjective   Deya is a 68 year old, presenting for the following health issues:  Cough      3/6/2024     1:16 PM   Additional Questions   Roomed by jennifer Bernal    History of Present Illness       Reason for visit:  Congestion in throat  Symptom onset:  1-3 days ago  Symptoms include:  Congestion in throat  Symptom intensity:  Moderate  Symptom progression:  Staying the same  Had these symptoms before:  Yes  Has tried/received treatment for these symptoms:  Yes  Previous treatment was successful:  Yes  Prior treatment description:  Prednisone    She eats 2-3 servings of fruits and vegetables daily.She consumes 0 sweetened beverage(s) daily.She exercises with enough effort to increase her heart rate 30 to 60 minutes per day.  She exercises with enough effort to increase her heart rate 6 days per week.   She is taking medications regularly.         Acute Illness  Acute illness concerns: cough and congestion  Onset/Duration: since January  Symptoms:  Fever: No  Chills/Sweats: No  Headache (location?): No  Sinus Pressure: No  Conjunctivitis:  No  Ear Pain: no  Rhinorrhea: No  Congestion: YES  Sore Throat: YES  Cough: YES  Wheeze: No  Decreased Appetite: No  Nausea: No  Vomiting: No  Diarrhea: No  Dysuria/Freq.: No  Dysuria or Hematuria: No  Fatigue/Achiness: N/A  Sick/Strep Exposure: No  Therapies tried and outcome: belkis josue cough and cold medicine    Tested positive for covid in January, was given prednisone in February and felt better for two weeks but developed a cough on Monday, has been phlegmy. She  also reports a sore throat.          Objective    /84 (BP Location: Right arm, Patient Position: Sitting, Cuff Size: Adult Regular)   Pulse 110   Temp 97.6  F (36.4  C) (Oral)   Wt 71.4 kg (157 lb 6.4 oz)   SpO2 99%   BMI 25.41 kg/m    Body mass index is 25.41 kg/m .  Physical Exam   GENERAL: alert and no distress  EYES: Eyes grossly normal to inspection, PERRL and conjunctivae and sclerae normal  HENT: ear canals and TM's normal, nose and mouth without ulcers or lesions  NECK: no adenopathy, no asymmetry, masses, or scars  RESP: lungs clear to auscultation - no rales, rhonchi or wheezes  CV: regular rate and rhythm, normal S1 S2, no S3 or S4, no murmur, click or rub, no peripheral edema            Signed Electronically by: ALISA Philippe CNP

## 2024-03-07 ENCOUNTER — MYC MEDICAL ADVICE (OUTPATIENT)
Dept: FAMILY MEDICINE | Facility: CLINIC | Age: 69
End: 2024-03-07
Payer: COMMERCIAL

## 2024-03-07 NOTE — TELEPHONE ENCOUNTER
Patient sent alternate mychart message today    RN sent mychart message in that encounter    Closing this one    Jane Maher RN

## 2024-03-08 ENCOUNTER — MYC MEDICAL ADVICE (OUTPATIENT)
Dept: FAMILY MEDICINE | Facility: CLINIC | Age: 69
End: 2024-03-08
Payer: COMMERCIAL

## 2024-03-08 NOTE — TELEPHONE ENCOUNTER
Per NT Protocol:    EXPECTED COURSE:   * Sore throats with viral illnesses usually last 3 or 4 days.     JEROME SutherlandN RN  [facility]

## 2024-03-11 ENCOUNTER — MYC MEDICAL ADVICE (OUTPATIENT)
Dept: FAMILY MEDICINE | Facility: CLINIC | Age: 69
End: 2024-03-11
Payer: COMMERCIAL

## 2024-03-11 DIAGNOSIS — R05.1 ACUTE COUGH: ICD-10-CM

## 2024-03-11 DIAGNOSIS — J02.9 SORE THROAT: Primary | ICD-10-CM

## 2024-03-11 RX ORDER — PREDNISONE 20 MG/1
20 TABLET ORAL 2 TIMES DAILY
Qty: 10 TABLET | Refills: 0 | Status: SHIPPED | OUTPATIENT
Start: 2024-03-11 | End: 2024-03-16

## 2024-07-06 ENCOUNTER — HEALTH MAINTENANCE LETTER (OUTPATIENT)
Age: 69
End: 2024-07-06

## 2024-09-11 ENCOUNTER — HOSPITAL ENCOUNTER (OUTPATIENT)
Facility: CLINIC | Age: 69
End: 2024-09-11
Attending: PODIATRIST | Admitting: PODIATRIST
Payer: COMMERCIAL

## 2024-09-11 DIAGNOSIS — R26.89 UNABLE TO BEAR WEIGHT ON RIGHT LOWER EXTREMITY: Primary | ICD-10-CM

## 2024-09-11 DIAGNOSIS — M20.21 HALLUX RIGIDUS, RIGHT FOOT: ICD-10-CM

## 2024-09-30 ENCOUNTER — OFFICE VISIT (OUTPATIENT)
Dept: FAMILY MEDICINE | Facility: CLINIC | Age: 69
End: 2024-09-30
Payer: COMMERCIAL

## 2024-09-30 VITALS
DIASTOLIC BLOOD PRESSURE: 78 MMHG | BODY MASS INDEX: 24.98 KG/M2 | RESPIRATION RATE: 20 BRPM | OXYGEN SATURATION: 99 % | WEIGHT: 155.4 LBS | SYSTOLIC BLOOD PRESSURE: 129 MMHG | HEART RATE: 114 BPM | HEIGHT: 66 IN | TEMPERATURE: 97.9 F

## 2024-09-30 DIAGNOSIS — M79.674 GREAT TOE PAIN, RIGHT: ICD-10-CM

## 2024-09-30 DIAGNOSIS — M79.7 FIBROMYALGIA AFFECTING MULTIPLE SITES: ICD-10-CM

## 2024-09-30 DIAGNOSIS — Z01.818 PREOP GENERAL PHYSICAL EXAM: Primary | ICD-10-CM

## 2024-09-30 PROCEDURE — 99214 OFFICE O/P EST MOD 30 MIN: CPT | Performed by: PHYSICIAN ASSISTANT

## 2024-09-30 ASSESSMENT — PAIN SCALES - GENERAL: PAINLEVEL: MODERATE PAIN (5)

## 2024-09-30 NOTE — PATIENT INSTRUCTIONS
How to Take Your Medication Before Surgery  Preoperative Medication Instructions   Antiplatelet or Anticoagulation Medication Instructions   - Patient is on no antiplatelet or anticoagulation medications.    Additional Medication Instructions  Take all scheduled medications on the day of surgery EXCEPT for modifications listed below: Skip metamucil and zyrtec the day of surgery    You should stop using any Ibuprofen (Advil), Naproxen (Aleve) or Aspirin containing products 7 days before your procedure. You may use Tylenol (Acetaminophen) as needed.         Patient Education   Preparing for Your Surgery  For Adults  Getting started  In most cases, a nurse will call to review your health history and instructions. They will give you an arrival time based on your scheduled surgery time. Please be ready to share:  Your doctor's clinic name and phone number  Your medical, surgical, and anesthesia history  A list of allergies and sensitivities  A list of medicines, including herbal treatments and over-the-counter drugs  Whether the patient has a legal guardian (ask how to send us the papers in advance)  Note: You may not receive a call if you were seen at our PAC (Preoperative Assessment Center).  Please tell us if you're pregnant--or if there's any chance you might be pregnant. Some surgeries may injure a fetus (unborn baby), so they require a pregnancy test. Surgeries that are safe for a fetus don't always need a test, and you can choose whether to have one.   Preparing for surgery  Within 10 to 30 days of surgery: Have a pre-op exam (sometimes called an H&P, or History and Physical). This can be done at a clinic or pre-operative center.  If you're having a , you may not need this exam. Talk to your care team.  At your pre-op exam, talk to your care team about all medicines you take. (This includes CBD oil and any drugs, such as THC, marijuana, and other forms of cannabis.) If you need to stop any medicine before  surgery, ask when to start taking it again.  This is for your safety. Many medicines and drugs can make you bleed too much during surgery. Some change how well surgery (anesthesia) drugs work.  Call your insurance company to let them know you're having surgery. (If you don't have insurance, call [phone].)  Call your clinic if there's any change in your health. This includes a scrape or scratch near the surgery site, or any signs of a cold (sore throat, runny nose, cough, rash, fever).  Eating and drinking guidelines  For your safety: Unless your surgeon tells you otherwise, follow the guidelines below.  Eat and drink as normal until 8 hours before you arrive for surgery. After that, no food or milk. You can spit out gum when you arrive.  Drink clear liquids until 2 hours before you arrive. These are liquids you can see through, like water, Gatorade, and Propel Water. They also include plain black coffee and tea (no cream or milk).  No alcohol for 24 hours before you arrive. The night before surgery, stop any drinks that contain THC.  If your care team tells you to take medicine on the morning of surgery, it's okay to take it with a sip of water. No other medicines or drugs are allowed (including CBD oil)--follow your care team's instructions.  If you have questions the day of surgery, call your hospital or surgery center.   Preventing infection  Shower or bathe the night before and the morning of surgery. Follow the instructions your clinic gave you. (If no instructions, use regular soap.)  Don't shave or clip hair near your surgery site. We'll remove the hair if needed.  Don't smoke or vape the morning of surgery. No chewing tobacco for 6 hours before you arrive. A nicotine patch is okay. You may spit out nicotine gum when you arrive.  For some surgeries, the surgeon will tell you to fully quit smoking and nicotine.  We will make every effort to keep you safe from infection. We will:  Clean our hands often  with soap and water (or an alcohol-based hand rub).  Clean the skin at your surgery site with a special soap that kills germs.  Give you a special gown to keep you warm. (Cold raises the risk of infection.)  Wear hair covers, masks, gowns, and gloves during surgery.  Give antibiotic medicine, if prescribed. Not all surgeries need this medicine.  What to bring on the day of surgery  Photo ID and insurance card  Copy of your health care directive, if you have one  Glasses and hearing aids (bring cases)  You can't wear contacts during surgery  Inhaler and eye drops, if you use them (tell us about these when you arrive)  CPAP machine or breathing device, if you use them  A few personal items, if spending the night  If you have . . .  A pacemaker, ICD (cardiac defibrillator), or other implant: Bring the ID card.  An implanted stimulator: Bring the remote control.  A legal guardian: Bring a copy of the certified (court-stamped) guardianship papers.  Please remove any jewelry, including body piercings. Leave jewelry and other valuables at home.  If you're going home the day of surgery  You must have a responsible adult drive you home. They should stay with you overnight as well.  If you don't have someone to stay with you, and you aren't safe to go home alone, we may keep you overnight. Insurance often won't pay for this.  After surgery  If it's hard to control your pain or you need more pain medicine, please call your surgeon's office.  Questions?   If you have any questions for your care team, list them here:   ____________________________________________________________________________________________________________________________________________________________________________________________________________________________________________________________  For informational purposes only. Not to replace the advice of your health care provider. Copyright   2003, 2019 [facility]. All rights reserved.  Clinically reviewed by Messi Pena MD. [employer] [facility] [id number] - REV 08/24.

## 2024-09-30 NOTE — PROGRESS NOTES
Preoperative Evaluation  [facility]  [address] [facility] [address] [facility] [address] [facility]  [place] [address]  Phone: [phone]  Primary Provider: [facility]  Pre-op Performing Provider: Susie Hernandez PA-C  Sep 30, 2024             9/25/2024   Surgical Information   What procedure is being done? RIGHT-FIRST METATARSOPHALANGEAL JOINT ARTHRODESIS RIGHT FOOT    Facility or Hospital where procedure/surgery will be performed: [facility]   Who is doing the procedure / surgery? Dr Domencia Julio   Date of surgery / procedure: 10/23/2024   Time of surgery / procedure: 9:15 AM   Where do you plan to recover after surgery? at home with family        Fax number for surgical facility: Note does not need to be faxed, will be available electronically in Epic.    Assessment & Plan     The proposed surgical procedure is considered LOW risk.    Preop general physical exam  Great toe pain, right  Fibromyalgia affecting multiple sites        - No identified additional risk factors other than previously addressed    Preoperative Medication Instructions  Antiplatelet or Anticoagulation Medication Instructions   - Patient is on no antiplatelet or anticoagulation medications.    Additional Medication Instructions  Take all scheduled medications on the day of surgery EXCEPT for modifications listed below: Skip metamucil and zyrtec the day of surgery    You should stop using any Ibuprofen (Advil), Naproxen (Aleve) or Aspirin containing products 7 days before your procedure. You may use Tylenol (Acetaminophen) as needed.    Recommendation  Approval given to proceed with proposed procedure, without further diagnostic evaluation.    Jacob Falk is a 69 year old, presenting for the following:  Pre-Op Exam          9/30/2024     1:34 PM   Additional Questions   Roomed by aracely MACIAS related to upcoming procedure: Having right great toe pain post bunionectomy and now requires a  fusion.         9/25/2024   Pre-Op Questionnaire   Have you ever had a heart attack or stroke? No   Have you ever had surgery on your heart or blood vessels, such as a stent placement, a coronary artery bypass, or surgery on an artery in your head, neck, heart, or legs? No   Do you have chest pain with activity? No   Do you have a history of heart failure? No   Do you currently have a cold, bronchitis or symptoms of other infection? No   Do you have a cough, shortness of breath, or wheezing? No   Do you or anyone in your family have previous history of blood clots? No   Do you or does anyone in your family have a serious bleeding problem such as prolonged bleeding following surgeries or cuts? No   Have you ever had problems with anemia or been told to take iron pills? No   Have you had any abnormal blood loss such as black, tarry or bloody stools, or abnormal vaginal bleeding? No   Have you ever had a blood transfusion? (!) YES   Have you ever had a transfusion reaction? No   Are you willing to have a blood transfusion if it is medically needed before, during, or after your surgery? Yes   Have you or any of your relatives ever had problems with anesthesia? No   Do you have sleep apnea, excessive snoring or daytime drowsiness? No   Do you have any artifical heart valves or other implanted medical devices like a pacemaker, defibrillator, or continuous glucose monitor? No   Do you have artificial joints? No   Are you allergic to latex? No        Health Care Directive  Patient does not have a Health Care Directive or Living Will: Advance Directive received and scanned. Click on Code in the patient header to view.    Preoperative Review of    reviewed - no record of controlled substances prescribed.          Patient Active Problem List    Diagnosis Date Noted    Bilateral carpal tunnel syndrome 02/04/2020     Priority: Medium    Early menopause 02/04/2020     Priority: Medium     Age 43      GERD (gastroesophageal  reflux disease) 02/04/2020     Priority: Medium    Hearing loss 02/04/2020     Priority: Medium     has a R hearing aid, does not like wearing it because it irritates her skin      Hiatal hernia 02/04/2020     Priority: Medium    HLD (hyperlipidemia) 02/04/2020     Priority: Medium    Overactive bladder 02/04/2020     Priority: Medium     No incontinence      Tinnitus 02/04/2020     Priority: Medium     has had for 20 years, chronic      Osteopenia of lumbar spine 08/12/2019     Priority: Medium     BMD 8/6/2019 Max neg T score -1.1 Lumbar spine. 10 yr FRAX risk MOF 16%, Hip 0.7%      Osteoarthritis of both hips, unspecified osteoarthritis type 08/01/2019     Priority: Medium    Fibromyalgia affecting multiple sites 07/01/2019     Priority: Medium    IBS (irritable bowel syndrome) 03/01/2019     Priority: Medium    Primary osteoarthritis of both knees 08/30/2016     Priority: Medium    Kidney stones 01/01/2013     Priority: Medium     Has had three stones - two passed spontaneously, one was removed surgically      Seasonal allergies 10/04/2012     Priority: Medium    Personal history of kidney stones 10/04/2012     Priority: Medium      Past Medical History:   Diagnosis Date    Arthritis 1990    Knees, hips    History of blood transfusion 1978    Childbirth    History of depression     Personal history of kidney stones 10/04/2012    Seasonal allergies 10/04/2012     Past Surgical History:   Procedure Laterality Date    BUNIONECTOMY      right foot    C/SECTION, CLASSICAL      2 times    COLONOSCOPY      COLONOSCOPY N/A 03/26/2019    Procedure: Combined Colonoscopy, Single Or Multiple Biopsy/Polypectomy By Biopsy;  Surgeon: Jose L Leigh MD;  Location: [facility] OR    COLONOSCOPY WITH CO2 INSUFFLATION N/A 03/26/2019    Procedure: COLONOSCOPY WITH CO2 INSUFFLATION;  Surgeon: Jose L Leigh MD;  Location: [facility] OR    FUSION CERVICAL ANTERIOR ONE LEVEL      c4-5    HEAD & NECK SURGERY  2011    Fusion C4-C5     "SINUS SURGERY      deviated septum correction    TONSILLECTOMY & ADENOIDECTOMY       Current Outpatient Medications   Medication Sig Dispense Refill    cetirizine (ZYRTEC) 10 MG tablet Take 10 mg by mouth      psyllium (METAMUCIL) 58.6 % packet Take 1 packet by mouth daily      benzonatate (TESSALON) 200 MG capsule Take 1 capsule (200 mg) by mouth 3 times daily as needed for cough 30 capsule 0       Allergies   Allergen Reactions    Codeine Phosphate Nausea    Erythromycin Nausea    Statins Muscle Pain (Myalgia)     Muscle aches        Social History     Tobacco Use    Smoking status: Never     Passive exposure: Never    Smokeless tobacco: Never   Substance Use Topics    Alcohol use: No       History   Drug Use No             Review of Systems  CONSTITUTIONAL: NEGATIVE for fever, chills, change in weight  INTEGUMENTARY/SKIN: NEGATIVE for worrisome rashes, moles or lesions  ENT/MOUTH: NEGATIVE for ear, mouth and throat problems  RESP: NEGATIVE for significant cough or SOB  CV: NEGATIVE for chest pain, palpitations or peripheral edema  GI: NEGATIVE for nausea, abdominal pain, heartburn, or change in bowel habits  NEURO: NEGATIVE for weakness, dizziness or paresthesias  HEME: NEGATIVE for bleeding problems    Objective    /78 (BP Location: Left arm, Patient Position: Chair, Cuff Size: Adult Large)   Pulse 114   Temp 97.9  F (36.6  C) (Temporal)   Resp 20   Ht 1.676 m (5' 6\")   Wt 70.5 kg (155 lb 6.4 oz)   SpO2 99%   BMI 25.08 kg/m     Estimated body mass index is 25.08 kg/m  as calculated from the following:    Height as of this encounter: 1.676 m (5' 6\").    Weight as of this encounter: 70.5 kg (155 lb 6.4 oz).  Physical Exam  GENERAL: alert and no distress  EYES: Eyes grossly normal to inspection, PERRL and conjunctivae and sclerae normal  HENT: ear canals and TM's normal, nose and mouth without ulcers or lesions  NECK: no adenopathy, no asymmetry, masses, or scars  RESP: lungs clear to auscultation - no " rales, rhonchi or wheezes  CV: regular rate and rhythm, normal S1 S2, no S3 or S4, no murmur, click or rub, no peripheral edema  ABDOMEN: soft, nontender, no hepatosplenomegaly, no masses   MS: no gross musculoskeletal defects noted, no edema  SKIN: no suspicious lesions or rashes  NEURO: Normal strength and tone, mentation intact and speech normal  PSYCH: mentation appears normal, affect normal/bright    Recent Labs   Lab Test 02/15/24  0659      POTASSIUM 4.4   CR 0.83        Diagnostics  No labs were ordered during this visit.   No EKG required, no history of coronary heart disease, significant arrhythmia, peripheral arterial disease or other structural heart disease.    Revised Cardiac Risk Index (RCRI)  The patient has the following serious cardiovascular risks for perioperative complications:   - No serious cardiac risks = 0 points     RCRI Interpretation: 0 points: Class I (very low risk - 0.4% complication rate)         Signed Electronically by: Ssuie Hernandez PA-C  A copy of this evaluation report is provided to the requesting physician.

## 2024-10-17 RX ORDER — ACETAMINOPHEN 325 MG/1
650 TABLET ORAL
Status: CANCELLED | OUTPATIENT
Start: 2024-10-17

## 2024-10-17 RX ORDER — CEFAZOLIN SODIUM/WATER 2 G/20 ML
2 SYRINGE (ML) INTRAVENOUS
Status: CANCELLED | OUTPATIENT
Start: 2024-10-17

## 2024-10-17 RX ORDER — CEFAZOLIN SODIUM/WATER 2 G/20 ML
2 SYRINGE (ML) INTRAVENOUS SEE ADMIN INSTRUCTIONS
Status: CANCELLED | OUTPATIENT
Start: 2024-10-17

## 2024-10-17 RX ORDER — OXYCODONE HYDROCHLORIDE 5 MG/1
5 TABLET ORAL
Status: CANCELLED | OUTPATIENT
Start: 2024-10-17

## 2024-11-06 ENCOUNTER — MYC MEDICAL ADVICE (OUTPATIENT)
Dept: FAMILY MEDICINE | Facility: CLINIC | Age: 69
End: 2024-11-06
Payer: COMMERCIAL

## 2024-11-07 NOTE — TELEPHONE ENCOUNTER
Since patient was ill. It would be best to have another physical exam to make sure no lingering issues prior to surgery.   Susie Hernandez PA-C

## 2024-11-12 ENCOUNTER — OFFICE VISIT (OUTPATIENT)
Dept: FAMILY MEDICINE | Facility: CLINIC | Age: 69
End: 2024-11-12
Payer: COMMERCIAL

## 2024-11-12 VITALS
SYSTOLIC BLOOD PRESSURE: 138 MMHG | BODY MASS INDEX: 24.91 KG/M2 | WEIGHT: 155 LBS | OXYGEN SATURATION: 100 % | DIASTOLIC BLOOD PRESSURE: 81 MMHG | TEMPERATURE: 97.6 F | HEART RATE: 100 BPM | RESPIRATION RATE: 19 BRPM | HEIGHT: 66 IN

## 2024-11-12 DIAGNOSIS — J01.90 ACUTE BACTERIAL SINUSITIS: Primary | ICD-10-CM

## 2024-11-12 DIAGNOSIS — B96.89 ACUTE BACTERIAL SINUSITIS: Primary | ICD-10-CM

## 2024-11-12 PROCEDURE — 99213 OFFICE O/P EST LOW 20 MIN: CPT | Performed by: PHYSICIAN ASSISTANT

## 2024-11-12 RX ORDER — FLUTICASONE PROPIONATE 50 MCG
1 SPRAY, SUSPENSION (ML) NASAL DAILY
COMMUNITY
Start: 2024-02-15

## 2024-11-12 NOTE — PROGRESS NOTES
"  Assessment & Plan     Acute bacterial sinusitis  Discussed symptoms and timeframe consistent with bacterial sinusitis. In addition to antibiotic therapy, discussed continuing saline nasal spray, flonase nasal spray, and other at home remedies to reduce symptoms. Deya was instructed to follow up should she experience a high fever or significantly worsening symptoms.  - amoxicillin-clavulanate (AUGMENTIN) 875-125 MG tablet; Take 1 tablet by mouth 2 times daily for 7 days.          BMI  Estimated body mass index is 25.02 kg/m  as calculated from the following:    Height as of this encounter: 1.676 m (5' 6\").    Weight as of this encounter: 70.3 kg (155 lb).             Subjective   Deya is a 69 year old, presenting for the following health issues:  Sinus Problem      11/12/2024    12:03 PM   Additional Questions   Roomed by Nona   Accompanied by self         11/12/2024    12:03 PM   Patient Reported Additional Medications   Patient reports taking the following new medications flonase daily     History of Present Illness       Reason for visit:  Sinus drainage  Symptom onset:  More than a month  Symptoms include:  Sinus drainage off and on for over a month  Symptom intensity:  Moderate  Symptom progression:  Staying the same  Had these symptoms before:  Yes  Has tried/received treatment for these symptoms:  Yes  Previous treatment was successful:  No   She is taking medications regularly.     States she has had ongoing sinus issues that she has seen specialists for. Has felt ill for the past 30 days which has worsened over the past few weeks. She endorses a sore throat, feels her voice is deeper/raspier, feels achey but may be related to fibromyalgia, nasal drainage down her throat but is not blowing her throat. Has had sinus pressure headache, ear aches with occasional sharp pain. She states she had a cough for only a few days but that went away quickly. 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    Is using zyrtec, saline nasal spray, Flonase spray to help and it is no longer working to mitigate her symptoms. Jocelyn salas cold medicine usually helps dry up the drainage but it is not effective.     Denies fever, nausea/vomiting, denies congestion, dizziness. Denies recent illness. Denies chest pain, SOB, difficulty breathing.                  Objective    /81   Pulse 100   Temp 97.6  F (36.4  C) (Temporal)   Resp 19   Ht 1.676 m (5' 6\")   Wt 70.3 kg (155 lb)   SpO2 100%   BMI 25.02 kg/m    Body mass index is 25.02 kg/m .  Physical Exam  Constitutional:       General: She is not in acute distress.     Appearance: Normal appearance. She is not toxic-appearing.   HENT:      Head: Normocephalic.      Right Ear: Tympanic membrane, ear canal and external ear normal.      Left Ear: Tympanic membrane, ear canal and external ear normal.      Nose: No congestion or rhinorrhea.      Right Turbinates: Not enlarged or swollen.      Left Turbinates: Not enlarged or swollen.      Right Sinus: Maxillary sinus tenderness present. No frontal sinus tenderness.      Left Sinus: Maxillary sinus tenderness present. No frontal sinus tenderness.      Mouth/Throat:      Mouth: Mucous membranes are moist.      Pharynx: Posterior oropharyngeal erythema present.   Eyes:      General:         Right eye: No discharge.         Left eye: No discharge.      Conjunctiva/sclera: Conjunctivae normal.   Cardiovascular:      Rate and Rhythm: Normal rate and regular rhythm.      Pulses: Normal pulses.      Heart sounds: Normal heart sounds. No murmur heard.     No friction rub. No gallop.   Pulmonary:      Effort: Pulmonary effort is normal. No respiratory distress.      Breath sounds: Normal breath sounds. No wheezing, rhonchi or rales.   Musculoskeletal:      Cervical back: Neck supple. No tenderness.   Lymphadenopathy:      Cervical: No cervical adenopathy. "   Neurological:      General: No focal deficit present.      Mental Status: She is alert and oriented to person, place, and time.   Psychiatric:         Mood and Affect: Mood normal.         Behavior: Behavior normal.         Thought Content: Thought content normal.         Judgment: Judgment normal.                  Yuridia Hunter, PA-S2  The above student acted as scribe and the encounter documented above was completely performed by myself and the documentation reflects the work I have performed today.    Signed Electronically by: SILVANO ArellanoC

## 2024-11-18 ENCOUNTER — OFFICE VISIT (OUTPATIENT)
Dept: FAMILY MEDICINE | Facility: CLINIC | Age: 69
End: 2024-11-18
Payer: COMMERCIAL

## 2024-11-18 VITALS
BODY MASS INDEX: 24.91 KG/M2 | RESPIRATION RATE: 19 BRPM | SYSTOLIC BLOOD PRESSURE: 139 MMHG | HEIGHT: 66 IN | OXYGEN SATURATION: 100 % | TEMPERATURE: 97.9 F | WEIGHT: 155 LBS | HEART RATE: 90 BPM | DIASTOLIC BLOOD PRESSURE: 79 MMHG

## 2024-11-18 DIAGNOSIS — Z01.818 PREOP GENERAL PHYSICAL EXAM: Primary | ICD-10-CM

## 2024-11-18 DIAGNOSIS — M79.674 GREAT TOE PAIN, RIGHT: ICD-10-CM

## 2024-11-18 DIAGNOSIS — M79.7 FIBROMYALGIA AFFECTING MULTIPLE SITES: ICD-10-CM

## 2024-11-18 PROCEDURE — 99214 OFFICE O/P EST MOD 30 MIN: CPT | Performed by: PHYSICIAN ASSISTANT

## 2024-11-18 NOTE — PATIENT INSTRUCTIONS
How to Take Your Medication Before Surgery  Preoperative Medication Instructions   Antiplatelet or Anticoagulation Medication Instructions   - Patient is on no antiplatelet or anticoagulation medications.    Additional Medication Instructions  Patient is on no additional chronic medications     You should stop using any Ibuprofen (Advil), Naproxen (Aleve) or Aspirin containing products 7 days before your procedure. You may use Tylenol (Acetaminophen) as needed.    Patient Education   Preparing for Your Surgery  For Adults  Getting started  In most cases, a nurse will call to review your health history and instructions. They will give you an arrival time based on your scheduled surgery time. Please be ready to share:  Your doctor's clinic name and phone number  Your medical, surgical, and anesthesia history  A list of allergies and sensitivities  A list of medicines, including herbal treatments and over-the-counter drugs  Whether the patient has a legal guardian (ask how to send us the papers in advance)  Note: You may not receive a call if you were seen at our PAC (Preoperative Assessment Center).  Please tell us if you're pregnant--or if there's any chance you might be pregnant. Some surgeries may injure a fetus (unborn baby), so they require a pregnancy test. Surgeries that are safe for a fetus don't always need a test, and you can choose whether to have one.   Preparing for surgery  Within 10 to 30 days of surgery: Have a pre-op exam (sometimes called an H&P, or History and Physical). This can be done at a clinic or pre-operative center.  If you're having a , you may not need this exam. Talk to your care team.  At your pre-op exam, talk to your care team about all medicines you take. (This includes CBD oil and any drugs, such as THC, marijuana, and other forms of cannabis.) If you need to stop any medicine before surgery, ask when to start taking it again.  This is for your safety. Many medicines and  drugs can make you bleed too much during surgery. Some change how well surgery (anesthesia) drugs work.  Call your insurance company to let them know you're having surgery. (If you don't have insurance, call [phone].)  Call your clinic if there's any change in your health. This includes a scrape or scratch near the surgery site, or any signs of a cold (sore throat, runny nose, cough, rash, fever).  Eating and drinking guidelines  For your safety: Unless your surgeon tells you otherwise, follow the guidelines below.  Eat and drink as normal until 8 hours before you arrive for surgery. After that, no food or milk. You can spit out gum when you arrive.  Drink clear liquids until 2 hours before you arrive. These are liquids you can see through, like water, Gatorade, and Propel Water. They also include plain black coffee and tea (no cream or milk).  No alcohol for 24 hours before you arrive. The night before surgery, stop any drinks that contain THC.  If your care team tells you to take medicine on the morning of surgery, it's okay to take it with a sip of water. No other medicines or drugs are allowed (including CBD oil)--follow your care team's instructions.  If you have questions the day of surgery, call your hospital or surgery center.   Preventing infection  Shower or bathe the night before and the morning of surgery. Follow the instructions your clinic gave you. (If no instructions, use regular soap.)  Don't shave or clip hair near your surgery site. We'll remove the hair if needed.  Don't smoke or vape the morning of surgery. No chewing tobacco for 6 hours before you arrive. A nicotine patch is okay. You may spit out nicotine gum when you arrive.  For some surgeries, the surgeon will tell you to fully quit smoking and nicotine.  We will make every effort to keep you safe from infection. We will:  Clean our hands often with soap and water (or an alcohol-based hand rub).  Clean the skin at your surgery site  with a special soap that kills germs.  Give you a special gown to keep you warm. (Cold raises the risk of infection.)  Wear hair covers, masks, gowns, and gloves during surgery.  Give antibiotic medicine, if prescribed. Not all surgeries need this medicine.  What to bring on the day of surgery  Photo ID and insurance card  Copy of your health care directive, if you have one  Glasses and hearing aids (bring cases)  You can't wear contacts during surgery  Inhaler and eye drops, if you use them (tell us about these when you arrive)  CPAP machine or breathing device, if you use them  A few personal items, if spending the night  If you have . . .  A pacemaker, ICD (cardiac defibrillator), or other implant: Bring the ID card.  An implanted stimulator: Bring the remote control.  A legal guardian: Bring a copy of the certified (court-stamped) guardianship papers.  Please remove any jewelry, including body piercings. Leave jewelry and other valuables at home.  If you're going home the day of surgery  You must have a responsible adult drive you home. They should stay with you overnight as well.  If you don't have someone to stay with you, and you aren't safe to go home alone, we may keep you overnight. Insurance often won't pay for this.  After surgery  If it's hard to control your pain or you need more pain medicine, please call your surgeon's office.  Questions?   If you have any questions for your care team, list them here:   ____________________________________________________________________________________________________________________________________________________________________________________________________________________________________________________________  For informational purposes only. Not to replace the advice of your health care provider. Copyright   2003, 2019 [facility]. All rights reserved. Clinically reviewed by Messi Pena MD. [employer] [id number] - REV 08/24.

## 2024-11-18 NOTE — PROGRESS NOTES
Preoperative Evaluation  [facility]  [address]  [place] [address]  Phone: [phone]  Primary Provider: [facility] Jodi Hood  Pre-op Performing Provider: Susie Hernandez PA-C  Nov 18, 2024 11/13/2024   Surgical Information   What procedure is being done? first metatarsophalangeal joint arthrodesis right foot    Facility or Hospital where procedure/surgery will be performed: [facility]    Who is doing the procedure / surgery? Dr Julio    Date of surgery / procedure: 11/26/2024    Time of surgery / procedure: 7:30 AM    Where do you plan to recover after surgery? at home with family        Patient-reported     Fax number for surgical facility: Note does not need to be faxed, will be available electronically in Epic.    Assessment & Plan     The proposed surgical procedure is considered INTERMEDIATE risk.    Preop general physical exam  Fibromyalgia affecting multiple sites  Great toe pain, right       - No identified additional risk factors other than previously addressed    Preoperative Medication Instructions  Antiplatelet or Anticoagulation Medication Instructions   - Patient is on no antiplatelet or anticoagulation medications.    Additional Medication Instructions  Patient is on no additional chronic medications    Recommendation  Approval given to proceed with proposed procedure, without further diagnostic evaluation.    Jacob Falk is a 69 year old, presenting for the following:  Pre-Op Exam          11/18/2024     9:40 AM   Additional Questions   Roomed by aracely MACIAS related to upcoming procedure: Having right great toe pain post bunionectomy and now requires a fusion.         11/13/2024   Pre-Op Questionnaire   Have you ever had a heart attack or stroke? No    Have you ever had surgery on your heart or blood vessels, such as a stent placement, a coronary artery bypass, or surgery on an artery in your head, neck, heart, or  legs? No    Do you have chest pain with activity? No    Do you have a history of heart failure? No    Do you currently have a cold, bronchitis or symptoms of other infection? (!) YES finishing antibiotics     Do you have a cough, shortness of breath, or wheezing? No    Do you or anyone in your family have previous history of blood clots? No    Do you or does anyone in your family have a serious bleeding problem such as prolonged bleeding following surgeries or cuts? No    Have you ever had problems with anemia or been told to take iron pills? No    Have you had any abnormal blood loss such as black, tarry or bloody stools, or abnormal vaginal bleeding? No    Have you ever had a blood transfusion? No    Are you willing to have a blood transfusion if it is medically needed before, during, or after your surgery? Yes    Have you or any of your relatives ever had problems with anesthesia? No    Do you have sleep apnea, excessive snoring or daytime drowsiness? No    Do you have any artifical heart valves or other implanted medical devices like a pacemaker, defibrillator, or continuous glucose monitor? No    Do you have artificial joints? No    Are you allergic to latex? No        Patient-reported     Health Care Directive  Patient has a Health Care Directive on file      Preoperative Review of    reviewed - no record of controlled substances prescribed.          Patient Active Problem List    Diagnosis Date Noted    Bilateral carpal tunnel syndrome 02/04/2020     Priority: Medium    Early menopause 02/04/2020     Priority: Medium     Age 43      GERD (gastroesophageal reflux disease) 02/04/2020     Priority: Medium    Hearing loss 02/04/2020     Priority: Medium     has a R hearing aid, does not like wearing it because it irritates her skin      Hiatal hernia 02/04/2020     Priority: Medium    HLD (hyperlipidemia) 02/04/2020     Priority: Medium    Overactive bladder 02/04/2020     Priority: Medium     No  incontinence      Tinnitus 02/04/2020     Priority: Medium     has had for 20 years, chronic      Osteopenia of lumbar spine 08/12/2019     Priority: Medium     BMD 8/6/2019 Max neg T score -1.1 Lumbar spine. 10 yr FRAX risk MOF 16%, Hip 0.7%      Osteoarthritis of both hips, unspecified osteoarthritis type 08/01/2019     Priority: Medium    Fibromyalgia affecting multiple sites 07/01/2019     Priority: Medium    IBS (irritable bowel syndrome) 03/01/2019     Priority: Medium    Primary osteoarthritis of both knees 08/30/2016     Priority: Medium    Kidney stones 01/01/2013     Priority: Medium     Has had three stones - two passed spontaneously, one was removed surgically      Seasonal allergies 10/04/2012     Priority: Medium    Personal history of kidney stones 10/04/2012     Priority: Medium      Past Medical History:   Diagnosis Date    Arthritis 1990    Knees, hips    History of blood transfusion 1978    Childbirth    History of depression     Personal history of kidney stones 10/04/2012    Seasonal allergies 10/04/2012     Past Surgical History:   Procedure Laterality Date    BUNIONECTOMY      right foot    C/SECTION, CLASSICAL      2 times    COLONOSCOPY      COLONOSCOPY N/A 03/26/2019    Procedure: Combined Colonoscopy, Single Or Multiple Biopsy/Polypectomy By Biopsy;  Surgeon: Jose L Leigh MD;  Location: [facility] OR    COLONOSCOPY WITH CO2 INSUFFLATION N/A 03/26/2019    Procedure: COLONOSCOPY WITH CO2 INSUFFLATION;  Surgeon: Jose L Leigh MD;  Location: [facility] OR    FUSION CERVICAL ANTERIOR ONE LEVEL      c4-5    HEAD & NECK SURGERY  2011    Fusion C4-C5    SINUS SURGERY      deviated septum correction    TONSILLECTOMY & ADENOIDECTOMY       Current Outpatient Medications   Medication Sig Dispense Refill    cetirizine (ZYRTEC) 10 MG tablet Take 10 mg by mouth      fluticasone (FLONASE) 50 MCG/ACT nasal spray Spray 1 spray into both nostrils daily.      psyllium (METAMUCIL) 58.6 % packet Take 1  "packet by mouth daily      amoxicillin-clavulanate (AUGMENTIN) 875-125 MG tablet Take 1 tablet by mouth 2 times daily for 7 days. (Patient not taking: Reported on 11/18/2024) 14 tablet 0       Allergies   Allergen Reactions    Codeine Phosphate Nausea    Erythromycin Nausea    Statins Muscle Pain (Myalgia)     Muscle aches        Social History     Tobacco Use    Smoking status: Never     Passive exposure: Never    Smokeless tobacco: Never   Substance Use Topics    Alcohol use: No       History   Drug Use No               Objective    /79 (BP Location: Left arm, Patient Position: Chair, Cuff Size: Adult Large)   Pulse 90   Temp 97.9  F (36.6  C) (Temporal)   Resp 19   Ht 1.676 m (5' 6\")   Wt 70.3 kg (155 lb)   SpO2 100%   BMI 25.02 kg/m     Estimated body mass index is 25.02 kg/m  as calculated from the following:    Height as of this encounter: 1.676 m (5' 6\").    Weight as of this encounter: 70.3 kg (155 lb).  Physical Exam  GENERAL: alert and no distress  EYES: Eyes grossly normal to inspection, PERRL and conjunctivae and sclerae normal  HENT: ear canals and TM's normal, nose and mouth without ulcers or lesions  NECK: no adenopathy, no asymmetry, masses, or scars  RESP: lungs clear to auscultation - no rales, rhonchi or wheezes  CV: regular rate and rhythm, normal S1 S2, no S3 or S4, no murmur, click or rub, no peripheral edema  ABDOMEN: soft, nontender, no hepatosplenomegaly, no masses  MS: no gross musculoskeletal defects noted, no edema  SKIN: no suspicious lesions or rashes  NEURO: Normal strength and tone, mentation intact and speech normal  PSYCH: mentation appears normal, affect normal/bright    Recent Labs   Lab Test 02/15/24  0659      POTASSIUM 4.4   CR 0.83        Diagnostics  No labs were ordered during this visit.   No EKG required, no history of coronary heart disease, significant arrhythmia, peripheral arterial disease or other structural heart disease.    Revised Cardiac Risk " Index (RCRI)  The patient has the following serious cardiovascular risks for perioperative complications:   - No serious cardiac risks = 0 points     RCRI Interpretation: 0 points: Class I (very low risk - 0.4% complication rate)         Signed Electronically by: Susie Hernandez PA-C  A copy of this evaluation report is provided to the requesting physician.

## 2024-11-20 RX ORDER — CARBOXYMETHYLCELLULOSE SODIUM 5 MG/ML
1 SOLUTION/ DROPS OPHTHALMIC DAILY PRN
COMMUNITY

## 2024-11-20 RX ORDER — ACETAMINOPHEN 500 MG
1000 TABLET ORAL EVERY 6 HOURS PRN
COMMUNITY

## 2024-11-20 RX ORDER — SOFT LENS ADJUNCTIVE SOLUTIONS
1 DROPS OPHTHALMIC (EYE) PRN
COMMUNITY

## 2024-11-20 NOTE — PROGRESS NOTES
PTA medications updated by Medication Scribe prior to surgery via phone call with patient (last doses completed by Nurse)     Medication history sources: Patient, Surescripts, and H&P  In the past week, patient estimated taking medication this percent of the time: Greater than 90%      Significant changes made to the medication list:  None      Additional medication history information:   Patient was advised to bring: Flonase, Saline nasal spray, Opcon-A, Refresh Tears    Medication reconciliation completed by provider prior to medication history? No    Time spent in this activity: 30 minutes    The information provided in this note is only as accurate as the sources available at the time of update(s)      Prior to Admission medications    Medication Sig Last Dose Taking? Auth Provider Long Term End Date   acetaminophen (TYLENOL) 500 MG tablet Take 1,000 mg by mouth every 6 hours as needed for mild pain ([id number]).  Yes Reported, Patient     amoxicillin-clavulanate (AUGMENTIN) 875-125 MG tablet Take 1 tablet by mouth 2 times daily for 7 days. 11/18/2024 Morning Yes Jayla Parikh PA-C  11/20/24   carboxymethylcellulose PF (REFRESH PLUS) 0.5 % ophthalmic solution Place 1 drop into both eyes daily as needed for dry eyes.  Yes Reported, Patient     cetirizine (ZYRTEC) 10 MG tablet Take 10 mg by mouth every morning. Morning Yes Reported, Patient     fluticasone (FLONASE) 50 MCG/ACT nasal spray Spray 1 spray into both nostrils daily.  Yes Reported, Patient     naphazoline-pheniramine (OPCON-A) SOLN ophthalmic solution Place 1 drop into both eyes as needed for irritation.  Yes Reported, Patient     psyllium (METAMUCIL) 58.6 % packet Take 1 packet by mouth 2 times daily. Morning Yes Reported, Patient     sodium chloride (OCEAN) 0.65 % nasal spray Spray 1 spray into both nostrils daily as needed for congestion.  Yes Reported, Patient         Medication history completed by: Nicola Mixon

## 2024-11-25 ENCOUNTER — ANESTHESIA EVENT (OUTPATIENT)
Dept: SURGERY | Facility: CLINIC | Age: 69
End: 2024-11-25
Payer: COMMERCIAL

## 2024-11-25 RX ORDER — CEFAZOLIN SODIUM/WATER 2 G/20 ML
2 SYRINGE (ML) INTRAVENOUS
Status: CANCELLED | OUTPATIENT
Start: 2024-11-25

## 2024-11-25 RX ORDER — CEFAZOLIN SODIUM/WATER 2 G/20 ML
2 SYRINGE (ML) INTRAVENOUS SEE ADMIN INSTRUCTIONS
Status: CANCELLED | OUTPATIENT
Start: 2024-11-25

## 2024-11-26 ENCOUNTER — APPOINTMENT (OUTPATIENT)
Dept: GENERAL RADIOLOGY | Facility: CLINIC | Age: 69
End: 2024-11-26
Attending: PODIATRIST
Payer: COMMERCIAL

## 2024-11-26 ENCOUNTER — ANESTHESIA (OUTPATIENT)
Dept: SURGERY | Facility: CLINIC | Age: 69
End: 2024-11-26
Payer: COMMERCIAL

## 2024-11-26 ENCOUNTER — HOSPITAL ENCOUNTER (OUTPATIENT)
Facility: CLINIC | Age: 69
Discharge: HOME | End: 2024-11-26
Attending: PODIATRIST | Admitting: PODIATRIST
Payer: COMMERCIAL

## 2024-11-26 VITALS
SYSTOLIC BLOOD PRESSURE: 140 MMHG | OXYGEN SATURATION: 98 % | TEMPERATURE: 97.9 F | HEART RATE: 101 BPM | BODY MASS INDEX: 24.99 KG/M2 | RESPIRATION RATE: 12 BRPM | DIASTOLIC BLOOD PRESSURE: 84 MMHG | WEIGHT: 155.5 LBS | HEIGHT: 66 IN

## 2024-11-26 DIAGNOSIS — M20.21 HALLUX RIGIDUS, RIGHT FOOT: Primary | ICD-10-CM

## 2024-11-26 DIAGNOSIS — M79.671 PAIN IN RIGHT FOOT: ICD-10-CM

## 2024-11-26 PROCEDURE — C1769 GUIDE WIRE: HCPCS | Performed by: PODIATRIST

## 2024-11-26 PROCEDURE — 271N000001 HC OR GENERAL SUPPLY NON-STERILE: Performed by: PODIATRIST

## 2024-11-26 PROCEDURE — 710N000009 HC RECOVERY PHASE 1, LEVEL 1, PER MIN: Performed by: PODIATRIST

## 2024-11-26 PROCEDURE — 710N000012 HC RECOVERY PHASE 2, PER MINUTE: Performed by: PODIATRIST

## 2024-11-26 PROCEDURE — 999N000141 HC STATISTIC PRE-PROCEDURE NURSING ASSESSMENT: Performed by: PODIATRIST

## 2024-11-26 PROCEDURE — L4361 PNEUMA/VAC WALK BOOT PRE OTS: HCPCS

## 2024-11-26 PROCEDURE — 999N000179 XR SURGERY CARM FLUORO LESS THAN 5 MIN W STILLS

## 2024-11-26 PROCEDURE — C1713 ANCHOR/SCREW BN/BN,TIS/BN: HCPCS | Performed by: PODIATRIST

## 2024-11-26 PROCEDURE — 272N000001 HC OR GENERAL SUPPLY STERILE: Performed by: PODIATRIST

## 2024-11-26 PROCEDURE — 250N000011 HC RX IP 250 OP 636

## 2024-11-26 PROCEDURE — C1762 CONN TISS, HUMAN(INC FASCIA): HCPCS | Performed by: PODIATRIST

## 2024-11-26 PROCEDURE — 250N000013 HC RX MED GY IP 250 OP 250 PS 637: Performed by: PODIATRIST

## 2024-11-26 PROCEDURE — 250N000009 HC RX 250: Performed by: PODIATRIST

## 2024-11-26 PROCEDURE — 370N000017 HC ANESTHESIA TECHNICAL FEE, PER MIN: Performed by: PODIATRIST

## 2024-11-26 PROCEDURE — 250N000009 HC RX 250

## 2024-11-26 PROCEDURE — 999N000065 XR FOOT PORT RIGHT 2 VIEWS: Mod: RT

## 2024-11-26 PROCEDURE — 258N000003 HC RX IP 258 OP 636

## 2024-11-26 PROCEDURE — 360N000083 HC SURGERY LEVEL 3 W/ FLUORO, PER MIN: Performed by: PODIATRIST

## 2024-11-26 PROCEDURE — 250N000011 HC RX IP 250 OP 636: Performed by: PODIATRIST

## 2024-11-26 DEVICE — IMP SCR ARTHREX MTP LP CORTICAL 3X20MM TI AR-8933-20: Type: IMPLANTABLE DEVICE | Site: FOOT | Status: FUNCTIONAL

## 2024-11-26 DEVICE — IMP SCREW BONE KREULOCK 3X20MM AR-8933VCL-20: Type: IMPLANTABLE DEVICE | Site: FOOT | Status: FUNCTIONAL

## 2024-11-26 DEVICE — MAXFORCE MTP PLATE, PETITE, 0°-0°, RIGHT
Type: IMPLANTABLE DEVICE | Site: FOOT | Status: FUNCTIONAL
Brand: ARTHREX®

## 2024-11-26 DEVICE — GRAFT BONE PUTTY DBX 0.5ML 038005: Type: IMPLANTABLE DEVICE | Site: FOOT | Status: FUNCTIONAL

## 2024-11-26 DEVICE — SCREW BN 18MM 3MM TI VA NS KREULOCK LF AR-8933VCL-18: Type: IMPLANTABLE DEVICE | Site: FOOT | Status: FUNCTIONAL

## 2024-11-26 RX ORDER — SODIUM CHLORIDE, SODIUM LACTATE, POTASSIUM CHLORIDE, CALCIUM CHLORIDE 600; 310; 30; 20 MG/100ML; MG/100ML; MG/100ML; MG/100ML
INJECTION, SOLUTION INTRAVENOUS CONTINUOUS PRN
Status: DISCONTINUED | OUTPATIENT
Start: 2024-11-26 | End: 2024-11-26

## 2024-11-26 RX ORDER — FENTANYL CITRATE 50 UG/ML
INJECTION, SOLUTION INTRAMUSCULAR; INTRAVENOUS PRN
Status: DISCONTINUED | OUTPATIENT
Start: 2024-11-26 | End: 2024-11-26

## 2024-11-26 RX ORDER — PROPOFOL 10 MG/ML
INJECTION, EMULSION INTRAVENOUS CONTINUOUS PRN
Status: DISCONTINUED | OUTPATIENT
Start: 2024-11-26 | End: 2024-11-26

## 2024-11-26 RX ORDER — OXYCODONE HYDROCHLORIDE 5 MG/1
5 TABLET ORAL
Status: COMPLETED | OUTPATIENT
Start: 2024-11-26 | End: 2024-11-26

## 2024-11-26 RX ORDER — MAGNESIUM HYDROXIDE 1200 MG/15ML
LIQUID ORAL PRN
Status: DISCONTINUED | OUTPATIENT
Start: 2024-11-26 | End: 2024-11-26 | Stop reason: HOSPADM

## 2024-11-26 RX ORDER — ONDANSETRON 2 MG/ML
INJECTION INTRAMUSCULAR; INTRAVENOUS PRN
Status: DISCONTINUED | OUTPATIENT
Start: 2024-11-26 | End: 2024-11-26

## 2024-11-26 RX ORDER — FENTANYL CITRATE 0.05 MG/ML
50 INJECTION, SOLUTION INTRAMUSCULAR; INTRAVENOUS EVERY 5 MIN PRN
Status: DISCONTINUED | OUTPATIENT
Start: 2024-11-26 | End: 2024-11-26 | Stop reason: HOSPADM

## 2024-11-26 RX ORDER — CEFAZOLIN SODIUM/WATER 2 G/20 ML
SYRINGE (ML) INTRAVENOUS PRN
Status: DISCONTINUED | OUTPATIENT
Start: 2024-11-26 | End: 2024-11-26

## 2024-11-26 RX ORDER — DEXAMETHASONE SODIUM PHOSPHATE 4 MG/ML
INJECTION, SOLUTION INTRA-ARTICULAR; INTRALESIONAL; INTRAMUSCULAR; INTRAVENOUS; SOFT TISSUE
Status: DISCONTINUED
Start: 2024-11-26 | End: 2024-11-26 | Stop reason: WASHOUT

## 2024-11-26 RX ORDER — DEXAMETHASONE SODIUM PHOSPHATE 4 MG/ML
INJECTION, SOLUTION INTRA-ARTICULAR; INTRALESIONAL; INTRAMUSCULAR; INTRAVENOUS; SOFT TISSUE PRN
Status: DISCONTINUED | OUTPATIENT
Start: 2024-11-26 | End: 2024-11-26

## 2024-11-26 RX ORDER — BUPIVACAINE HYDROCHLORIDE 5 MG/ML
INJECTION, SOLUTION EPIDURAL; INTRACAUDAL
Status: DISCONTINUED
Start: 2024-11-26 | End: 2024-11-26 | Stop reason: HOSPADM

## 2024-11-26 RX ORDER — LIDOCAINE HYDROCHLORIDE 20 MG/ML
INJECTION, SOLUTION INFILTRATION; PERINEURAL PRN
Status: DISCONTINUED | OUTPATIENT
Start: 2024-11-26 | End: 2024-11-26

## 2024-11-26 RX ORDER — HYDROCODONE BITARTRATE AND ACETAMINOPHEN 5; 325 MG/1; MG/1
1 TABLET ORAL EVERY 4 HOURS PRN
Qty: 36 TABLET | Refills: 0 | Status: SHIPPED | OUTPATIENT
Start: 2024-11-26 | End: 2024-12-02

## 2024-11-26 RX ORDER — ONDANSETRON 2 MG/ML
4 INJECTION INTRAMUSCULAR; INTRAVENOUS EVERY 30 MIN PRN
Status: DISCONTINUED | OUTPATIENT
Start: 2024-11-26 | End: 2024-11-26 | Stop reason: HOSPADM

## 2024-11-26 RX ORDER — ACETAMINOPHEN 325 MG/1
650 TABLET ORAL
Status: DISCONTINUED | OUTPATIENT
Start: 2024-11-26 | End: 2024-11-26 | Stop reason: HOSPADM

## 2024-11-26 RX ORDER — DEXAMETHASONE SODIUM PHOSPHATE 4 MG/ML
4 INJECTION, SOLUTION INTRA-ARTICULAR; INTRALESIONAL; INTRAMUSCULAR; INTRAVENOUS; SOFT TISSUE
Status: DISCONTINUED | OUTPATIENT
Start: 2024-11-26 | End: 2024-11-26 | Stop reason: HOSPADM

## 2024-11-26 RX ORDER — HYDROMORPHONE HCL IN WATER/PF 6 MG/30 ML
0.4 PATIENT CONTROLLED ANALGESIA SYRINGE INTRAVENOUS EVERY 5 MIN PRN
Status: DISCONTINUED | OUTPATIENT
Start: 2024-11-26 | End: 2024-11-26 | Stop reason: HOSPADM

## 2024-11-26 RX ORDER — KETOROLAC TROMETHAMINE 30 MG/ML
INJECTION, SOLUTION INTRAMUSCULAR; INTRAVENOUS PRN
Status: DISCONTINUED | OUTPATIENT
Start: 2024-11-26 | End: 2024-11-26

## 2024-11-26 RX ORDER — ONDANSETRON 4 MG/1
4 TABLET, ORALLY DISINTEGRATING ORAL EVERY 30 MIN PRN
Status: DISCONTINUED | OUTPATIENT
Start: 2024-11-26 | End: 2024-11-26 | Stop reason: HOSPADM

## 2024-11-26 RX ORDER — PROPOFOL 10 MG/ML
INJECTION, EMULSION INTRAVENOUS PRN
Status: DISCONTINUED | OUTPATIENT
Start: 2024-11-26 | End: 2024-11-26

## 2024-11-26 RX ORDER — CEFAZOLIN SODIUM/WATER 2 G/20 ML
2 SYRINGE (ML) INTRAVENOUS SEE ADMIN INSTRUCTIONS
Status: DISCONTINUED | OUTPATIENT
Start: 2024-11-26 | End: 2024-11-26 | Stop reason: HOSPADM

## 2024-11-26 RX ORDER — LIDOCAINE HYDROCHLORIDE 10 MG/ML
INJECTION, SOLUTION EPIDURAL; INFILTRATION; INTRACAUDAL; PERINEURAL
Status: DISCONTINUED
Start: 2024-11-26 | End: 2024-11-26 | Stop reason: HOSPADM

## 2024-11-26 RX ORDER — NALOXONE HYDROCHLORIDE 0.4 MG/ML
0.1 INJECTION, SOLUTION INTRAMUSCULAR; INTRAVENOUS; SUBCUTANEOUS
Status: DISCONTINUED | OUTPATIENT
Start: 2024-11-26 | End: 2024-11-26 | Stop reason: HOSPADM

## 2024-11-26 RX ORDER — FENTANYL CITRATE 0.05 MG/ML
25 INJECTION, SOLUTION INTRAMUSCULAR; INTRAVENOUS EVERY 5 MIN PRN
Status: DISCONTINUED | OUTPATIENT
Start: 2024-11-26 | End: 2024-11-26 | Stop reason: HOSPADM

## 2024-11-26 RX ORDER — HYDROMORPHONE HCL IN WATER/PF 6 MG/30 ML
0.2 PATIENT CONTROLLED ANALGESIA SYRINGE INTRAVENOUS EVERY 5 MIN PRN
Status: DISCONTINUED | OUTPATIENT
Start: 2024-11-26 | End: 2024-11-26 | Stop reason: HOSPADM

## 2024-11-26 RX ORDER — CEFAZOLIN SODIUM/WATER 2 G/20 ML
2 SYRINGE (ML) INTRAVENOUS
Status: DISCONTINUED | OUTPATIENT
Start: 2024-11-26 | End: 2024-11-26 | Stop reason: HOSPADM

## 2024-11-26 RX ADMIN — KETOROLAC TROMETHAMINE 30 MG: 30 INJECTION, SOLUTION INTRAMUSCULAR at 09:01

## 2024-11-26 RX ADMIN — PROPOFOL 200 MG: 10 INJECTION, EMULSION INTRAVENOUS at 07:33

## 2024-11-26 RX ADMIN — FENTANYL CITRATE 50 MCG: 50 INJECTION INTRAMUSCULAR; INTRAVENOUS at 07:33

## 2024-11-26 RX ADMIN — HYDROMORPHONE HYDROCHLORIDE 0.5 MG: 1 INJECTION, SOLUTION INTRAMUSCULAR; INTRAVENOUS; SUBCUTANEOUS at 09:01

## 2024-11-26 RX ADMIN — DEXAMETHASONE SODIUM PHOSPHATE 4 MG: 4 INJECTION, SOLUTION INTRA-ARTICULAR; INTRALESIONAL; INTRAMUSCULAR; INTRAVENOUS; SOFT TISSUE at 07:33

## 2024-11-26 RX ADMIN — ONDANSETRON 4 MG: 2 INJECTION INTRAMUSCULAR; INTRAVENOUS at 07:33

## 2024-11-26 RX ADMIN — Medication 2 G: at 07:30

## 2024-11-26 RX ADMIN — OXYCODONE HYDROCHLORIDE 5 MG: 5 TABLET ORAL at 09:59

## 2024-11-26 RX ADMIN — MIDAZOLAM 2 MG: 1 INJECTION INTRAMUSCULAR; INTRAVENOUS at 07:30

## 2024-11-26 RX ADMIN — PROPOFOL 150 MCG/KG/MIN: 10 INJECTION, EMULSION INTRAVENOUS at 07:33

## 2024-11-26 RX ADMIN — LIDOCAINE HYDROCHLORIDE 80 MG: 20 INJECTION, SOLUTION INFILTRATION; PERINEURAL at 07:33

## 2024-11-26 RX ADMIN — FENTANYL CITRATE 50 MCG: 50 INJECTION INTRAMUSCULAR; INTRAVENOUS at 07:45

## 2024-11-26 RX ADMIN — SODIUM CHLORIDE, POTASSIUM CHLORIDE, SODIUM LACTATE AND CALCIUM CHLORIDE: 600; 310; 30; 20 INJECTION, SOLUTION INTRAVENOUS at 07:33

## 2024-11-26 ASSESSMENT — ACTIVITIES OF DAILY LIVING (ADL)
ADLS_ACUITY_SCORE: 19
ADLS_ACUITY_SCORE: 41
ADLS_ACUITY_SCORE: 19
ADLS_ACUITY_SCORE: 19

## 2024-11-26 NOTE — OR NURSING
Dr. Miranda MDA done with pre-op eval. Dr. Griggs at bedside for pre-op eval. Daughter Celeste with pt. At bedside.

## 2024-11-26 NOTE — ANESTHESIA PREPROCEDURE EVALUATION
Anesthesia Pre-Procedure Evaluation    Patient: Deya Lee   MRN: [id number] : 1955        Procedure : Procedure(s):  first metatarsophalangeal joint arthrodesis right foot          Past Medical History:   Diagnosis Date    Arthritis     Knees, hips    History of blood transfusion     Childbirth    History of depression     Personal history of kidney stones 10/04/2012    Seasonal allergies 10/04/2012      Past Surgical History:   Procedure Laterality Date    BUNIONECTOMY      right foot    C/SECTION, CLASSICAL      2 times    COLONOSCOPY      COLONOSCOPY N/A 2019    Procedure: Combined Colonoscopy, Single Or Multiple Biopsy/Polypectomy By Biopsy;  Surgeon: Jose L Leigh MD;  Location: [facility] OR    COLONOSCOPY WITH CO2 INSUFFLATION N/A 2019    Procedure: COLONOSCOPY WITH CO2 INSUFFLATION;  Surgeon: Jose L Leigh MD;  Location: [facility] OR    FUSION CERVICAL ANTERIOR ONE LEVEL      c4-5    HEAD & NECK SURGERY      Fusion C4-C5    SINUS SURGERY      deviated septum correction    TONSILLECTOMY & ADENOIDECTOMY        Allergies   Allergen Reactions    Codeine Phosphate Nausea    Erythromycin Nausea    Statins Muscle Pain (Myalgia)     Muscle aches      Social History     Tobacco Use    Smoking status: Never     Passive exposure: Never    Smokeless tobacco: Never   Substance Use Topics    Alcohol use: No      Wt Readings from Last 1 Encounters:   24 70.5 kg (155 lb 8 oz)        Anesthesia Evaluation            ROS/MED HX  ENT/Pulmonary: Comment: Chronic tinnitus; recent bacterial sinusitis      Neurologic:       Cardiovascular:     (+) Dyslipidemia - -   -  - -                                      METS/Exercise Tolerance:     Hematologic:       Musculoskeletal:   (+)  arthritis,             GI/Hepatic:     (+) GERD,     hiatal hernia,              Renal/Genitourinary:       Endo:    (-) obesity   Psychiatric/Substance Use:     (+) psychiatric history depression      "  Infectious Disease:       Malignancy:       Other:            Physical Exam    Airway        Mallampati: II   TM distance: > 3 FB   Neck ROM: full   Mouth opening: > 3 cm    Respiratory Devices and Support         Dental  no notable dental history         Cardiovascular          Rhythm and rate: regular and normal     Pulmonary   pulmonary exam normal                OUTSIDE LABS:  CBC:   Lab Results   Component Value Date    WBC 8.2 07/11/2012    HGB 13.6 10/11/2012    HGB 13.2 07/11/2012    HCT 39.8 07/11/2012     07/11/2012     BMP:   Lab Results   Component Value Date     02/15/2024     05/23/2019    POTASSIUM 4.4 02/15/2024    POTASSIUM 4.0 05/23/2019    CHLORIDE 98 02/15/2024    CHLORIDE 102 05/23/2019    CO2 26 02/15/2024    CO2 30 05/23/2019    BUN 16.7 02/15/2024    BUN 13 05/23/2019    CR 0.83 02/15/2024    CR 0.76 05/23/2019    GLC 90 02/15/2024    GLC 90 02/15/2024     COAGS: No results found for: \"PTT\", \"INR\", \"FIBR\"  POC: No results found for: \"BGM\", \"HCG\", \"HCGS\"  HEPATIC: No results found for: \"ALBUMIN\", \"PROTTOTAL\", \"ALT\", \"AST\", \"GGT\", \"ALKPHOS\", \"BILITOTAL\", \"BILIDIRECT\", \"LENY\"  OTHER:   Lab Results   Component Value Date    DEWAYNE 9.5 02/15/2024    TSH 2.01 05/18/2021    CRP <2.9 05/18/2021    SED 14 05/18/2021       Anesthesia Plan    ASA Status:  2    NPO Status:  NPO Appropriate    Anesthesia Type: General.     - Airway: LMA   Induction: Intravenous, Propofol.   Maintenance: Balanced.        Consents    Anesthesia Plan(s) and associated risks, benefits, and realistic alternatives discussed. Questions answered and patient/representative(s) expressed understanding.     - Discussed:     - Discussed with:  Patient            Postoperative Care    Pain management: IV analgesics.   PONV prophylaxis: Ondansetron (or other 5HT-3), Dexamethasone or Solumedrol, Background Propofol Infusion     Comments:               Chris Miranda MD    I have reviewed the pertinent notes and labs " "in the chart from the past 30 days and (re)examined the patient.  Any updates or changes from those notes are reflected in this note.                         # Overweight: Estimated body mass index is 25.1 kg/m  as calculated from the following:    Height as of this encounter: 1.676 m (5' 6\").    Weight as of this encounter: 70.5 kg (155 lb 8 oz).             "

## 2024-11-26 NOTE — DISCHARGE INSTRUCTIONS
Today you received Toradol, an antiinflammatory medication similar to Ibuprofen.  You should not take other antiinflammatory medication, such as Ibuprofen, Motrin, Advil, Aleve, Naprosyn, etc until 3:00PM.      Same Day Surgery Discharge Instructions for  Sedation and General Anesthesia     It's not unusual to feel dizzy, light-headed or faint for up to 24 hours after surgery or while taking pain medication.  If you have these symptoms: sit for a few minutes before standing and have someone assist you when you get up to walk or use the bathroom.    You should rest and relax for the next 24 hours. We recommend you make arrangements to have an adult stay with you for at least 24 hours after your discharge.  Avoid hazardous and strenuous activity.    DO NOT DRIVE any vehicle or operate mechanical equipment for 24 hours following the end of your surgery.  Even though you may feel normal, your reactions may be affected by the medication you have received.    Do not drink alcoholic beverages for 24 hours following surgery.     Slowly progress to your regular diet as you feel able. It's not unusual to feel nauseated and/or vomit after receiving anesthesia.  If you develop these symptoms, drink clear liquids (apple juice, ginger ale, broth, 7-up, etc. ) until you feel better.  If your nausea and vomiting persists for 24 hours, please notify your surgeon.      All narcotic pain medications, along with inactivity and anesthesia, can cause constipation. Drinking plenty of liquids and increasing fiber intake will help.    For any questions of a medical nature, call your surgeon.    Do not make important decisions for 24 hours.    If you had general anesthesia, you may have a sore throat for a couple of days related to the breathing tube used during surgery.  You may use Cepacol lozenges to help with this discomfort.  If it worsens or if you develop a fever, contact your surgeon.     If you feel your pain is not well managed  with the pain medications prescribed by your surgeon, please contact your surgeon's office to let them know so they can address your concerns.      **If you have questions or concerns about your procedure,   call Dr. Julio  [phone]**

## 2024-11-26 NOTE — OP NOTE
PREOPERATIVE DIAGNOSIS: Right foot hallux rigidus     POSTOPERATIVE DIAGNOSIS: Right foot hallux rigidus     PROCEDURE PERFORMED: Right foot first metatarsophalangeal joint arthrodesis.     SURGEON: Jenny Julio D.P.M.     ANESTHESIA: General with local.     HEMOSTASIS: Ankle tourniquet at 250 mmHg.     ESTIMATED BLOOD LOSS: 5 mL.     SPECIMENS: None.     INDICATIONS FOR PROCEDURE: The patient is a 69-year-old female with history of fibromyalgia, and bunion surgery with revision 14 years ago. She presents complaining of pain to the big toe joint. The pain has been longstanding in nature. The big toe is laterally deviated, underrides the second digit. The first metatarsophalangeal joint is rigid, without 10 degree of dorsiflexion achieved in range of motion. The pain on palpation of the joint is aggravated with walking, pressure, and tight fitting shoes. She has failed all conservative treatments. The patient is interested in surgery at this time. On x-ray examination, she has significant joint space narrowing to the first metatarsophalangeal joint At this point, I did discuss options with him, and he would like to proceed with surgery. The patient was seen preoperatively. The procedure and postop course were discussed as well as the risks and complications, no guarantees were given. All questions were answered to the patient's satisfaction. The patient agreed to comply and opted to proceed with operation.     DESCRIPTION OF PROCEDURE: The patient was brought into the operating room, properly identified, and placed on the operating room table in a supine position. Following administration of general anesthesia,  The right foot was then scrubbed, prepped, and draped in the usual sterile and aseptic manner. The right foot was then elevated and exsanguinated, and the previously applied well-padded supramalleolar tourniquet was inflated to 250 mmHg. Attention was directed to the dorsal aspect of the first  metatarsophalangeal joint of the right foot where at this point, a 6-cm incision was made overlying the first metatarsophalangeal joint medial to the extensor hallucis longus tendon. The incision was deepened in the same plane with care taken to identify and retract all vital neurovascular structures. All superficial bleeders were cauterized as necessary. Next, a linear dorsal capsulotomy was performed overlying the first metatarsophalangeal joint, and the head of the first metatarsal as well as the base of the proximal phalanx were well visualized and exposed and freed from the surrounding soft tissue attachment. Next, it was noted that the first metatarsophalangeal joint had significant degenerative changes to the cartilage. Next, using the reamer system of [employer], the cartilages of the first metatarsal head and the base of the proximal phalanx were removed. A bone rongeur was also used to remove any exostoses around the joint area. The wound sites were flushed with copious amount of sterile saline at this time. Next, I used a 2-mm drill to fenestrate a first metatarsal head and the base of the proximal phalanx.     Next, the fusion sites were then temporarily fixated with a K wire. Next, multiple views of the fluoroscopy were taken to confirm the proper alignment of the hallux as well as to make sure that the hallux was held in approximately 5 degree of valgus in the frontal plane and just off the weightbearing surface in the sagittal plane. Next, there was some gapping noted at the lateral aspect, and I packed this area with some bone putty. I fixated the fusion site with the [employer] MaxForce first metatarsophalangeal joint fusion plate. A total of 4 locking screws and 1 nonlocking screw were used. The screws were placed following standard AO technique and  guidelines. Once again, multiple views of the fluoroscopy were taken and confirmed proper positions of the screws and plate. Stable fixation was  noted. The wound site was flushed with copious amounts of sterile saline. The capsular layer was then closed with 3-0 Vicryl, and then, the subcutaneous tissue was then closed with 3-0 Monocryl and then the skin was closed with 3-0 nylon. A 10 mL of 1:1 mixture of 1% lidocaine plain and 0.5% Marcaine plain were injected forming a modified Guillaume block around the first metatarsal.The incision site was then dressed with Adaptic, 4x4's, Kerlix, and Ace wrap. The tourniquet was also deflated, and adequate refill was noted to all digits of the right foot.     The patient tolerated the anesthesia and procedure well without complications, left the OR with vital signs stable and vascular status intact to the right foot. Following a period of postoperative monitoring, she will be discharged after being given both oral and written postoperative instruction. I was present for the entirety of the case and performed all aspects of the procedure.

## 2024-11-26 NOTE — ANESTHESIA POSTPROCEDURE EVALUATION
Patient: Deya Lee    Procedure: Procedure(s):  first metatarsophalangeal joint arthrodesis right foot       Anesthesia Type:  General    Note:  Disposition: Outpatient   Postop Pain Control: Uneventful            Sign Out: Well controlled pain   PONV: No   Neuro/Psych: Uneventful            Sign Out: Acceptable/Baseline neuro status   Airway/Respiratory: Uneventful            Sign Out: Acceptable/Baseline resp. status   CV/Hemodynamics: Uneventful            Sign Out: Acceptable CV status   Other NRE: NONE   DID A NON-ROUTINE EVENT OCCUR? No           Last vitals:  Vitals Value Taken Time   /84 11/26/24 1000   Temp 36.6  C (97.9  F) 11/26/24 1000   Pulse 96 11/26/24 1007   Resp 15 11/26/24 1007   SpO2 98 % 11/26/24 1007   Vitals shown include unfiled device data.    Electronically Signed By: Chris Miranda MD  November 26, 2024  4:05 PM

## 2024-11-26 NOTE — ANESTHESIA PROCEDURE NOTES
Airway       Patient location during procedure: OR  Staff -        CRNA: Roberto Vaughn APRN CRNA       Performed By: CRNA  Consent for Airway        Urgency: elective  Indications and Patient Condition       Indications for airway management: aleah-procedural       Induction type:intravenous       Mask difficulty assessment: 0 - not attempted    Final Airway Details       Final airway type: supraglottic airway    Supraglottic Airway Details        Type: LMA       Brand: I-Gel       LMA size: 4    Post intubation assessment        Placement verified by: capnometry, equal breath sounds and chest rise        Number of attempts at approach: 1       Number of other approaches attempted: 0       Secured with: tape       Ease of procedure: easy       Dentition: Intact and Unchanged

## 2024-12-03 ENCOUNTER — PATIENT OUTREACH (OUTPATIENT)
Dept: CARE COORDINATION | Facility: CLINIC | Age: 69
End: 2024-12-03
Payer: COMMERCIAL

## 2024-12-31 ENCOUNTER — PATIENT OUTREACH (OUTPATIENT)
Dept: CARE COORDINATION | Facility: CLINIC | Age: 69
End: 2024-12-31
Payer: COMMERCIAL

## 2025-01-06 ENCOUNTER — MYC MEDICAL ADVICE (OUTPATIENT)
Dept: FAMILY MEDICINE | Facility: CLINIC | Age: 70
End: 2025-01-06
Payer: COMMERCIAL

## 2025-01-06 DIAGNOSIS — Z12.11 SCREENING FOR COLON CANCER: Primary | ICD-10-CM

## 2025-01-14 ENCOUNTER — VIRTUAL VISIT (OUTPATIENT)
Dept: FAMILY MEDICINE | Facility: CLINIC | Age: 70
End: 2025-01-14
Payer: COMMERCIAL

## 2025-01-14 DIAGNOSIS — M79.7 FIBROMYALGIA AFFECTING MULTIPLE SITES: Primary | ICD-10-CM

## 2025-01-14 PROCEDURE — 98006 SYNCH AUDIO-VIDEO EST MOD 30: CPT | Performed by: PHYSICIAN ASSISTANT

## 2025-01-14 RX ORDER — TIZANIDINE 2 MG/1
2-4 TABLET ORAL 3 TIMES DAILY PRN
Qty: 40 TABLET | Refills: 0 | Status: SHIPPED | OUTPATIENT
Start: 2025-01-14

## 2025-01-14 NOTE — PROGRESS NOTES
"Deya is a 69 year old who is being evaluated via a billable video visit.    How would you like to obtain your AVS? MyChart  If the video visit is dropped, the invitation should be resent by: Text to cell phone: [phone]  Will anyone else be joining your video visit? No      Assessment & Plan     Fibromyalgia affecting multiple sites  Will do a trial of tizanidine. Patient will mychart in a couple of weeks and update me on success or failure. Discussed risks and benefits of medication and how to take.   - tiZANidine (ZANAFLEX) 2 MG tablet; Take 1-2 tablets (2-4 mg) by mouth 3 times daily as needed for muscle spasms.                The longitudinal plan of care for the diagnosis(es)/condition(s) as documented were addressed during this visit. Due to the added complexity in care, I will continue to support Deya in the subsequent management and with ongoing continuity of care.      Subjective   Deya is a 69 year old, presenting for the following health issues:  Medication Request (For sleep) and Fibromyalgia      Video Start Time: 5:25 PM    History of Present Illness       Reason for visit:  Fibro pain - want to try a med to sleep better    She eats 0-1 servings of fruits and vegetables daily.She consumes 0 sweetened beverage(s) daily.She exercises with enough effort to increase her heart rate 30 to 60 minutes per day.  She exercises with enough effort to increase her heart rate 5 days per week.   She is taking medications regularly.     Patient was diagnosed with fibromyalgia for 5 years.   Worse with recent foot surgery- unable to do anything. Was exercising 5 days a week prior to the foot surgery.   Has tried prescriptions in the past and had side effects.   Has a fear and anxiety of taking a lot of medications.     \"The pain is getting me down\"  Has a history of depression in early life. \"I feel like I am on the edge of the pit of depression\"  Has an appointment to see a counselor. No suicidal thoughts- has " "some hopelessness. \"I don't want to live like this for another 10 years\" \"I am not going to do this preventative testing because I don't want to live like this\"    Part of her fibro symptoms she sleeps terrible. 4-8 hours, getting up every few hours.   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 Standing for 30 minutes makes her back feel so spasms she can barely walk.     Has physical therapy this week for her foot.                 Objective           Vitals:  No vitals were obtained today due to virtual visit.    Physical Exam   GENERAL: alert and no distress  EYES: Eyes grossly normal to inspection.  No discharge or erythema, or obvious scleral/conjunctival abnormalities.  RESP: No audible wheeze, cough, or visible cyanosis.    SKIN: Visible skin clear. No significant rash, abnormal pigmentation or lesions.  NEURO: Cranial nerves grossly intact.  Mentation and speech appropriate for age.  PSYCH: Appropriate affect, tone, and pace of words          Video-Visit Details    Type of service:  Video Visit   Video End Time:5:39 PM  Originating Location (pt. Location): Home    Distant Location (provider location):  On-site  Platform used for Video Visit: Moo  Signed Electronically by: Susie Hernandez PA-C      "

## 2025-01-28 ENCOUNTER — MYC MEDICAL ADVICE (OUTPATIENT)
Dept: FAMILY MEDICINE | Facility: CLINIC | Age: 70
End: 2025-01-28
Payer: COMMERCIAL

## 2025-03-03 ENCOUNTER — NURSE TRIAGE (OUTPATIENT)
Dept: FAMILY MEDICINE | Facility: CLINIC | Age: 70
End: 2025-03-03
Payer: COMMERCIAL

## 2025-03-03 ENCOUNTER — OFFICE VISIT (OUTPATIENT)
Dept: URGENT CARE | Facility: URGENT CARE | Age: 70
End: 2025-03-03
Payer: COMMERCIAL

## 2025-03-03 VITALS
HEART RATE: 102 BPM | TEMPERATURE: 97.9 F | SYSTOLIC BLOOD PRESSURE: 146 MMHG | OXYGEN SATURATION: 100 % | BODY MASS INDEX: 24.95 KG/M2 | RESPIRATION RATE: 18 BRPM | DIASTOLIC BLOOD PRESSURE: 88 MMHG | WEIGHT: 154.6 LBS

## 2025-03-03 DIAGNOSIS — F41.0 ANXIETY ATTACK: ICD-10-CM

## 2025-03-03 DIAGNOSIS — R07.89 CHEST TIGHTNESS: Primary | ICD-10-CM

## 2025-03-03 PROCEDURE — 3077F SYST BP >= 140 MM HG: CPT | Performed by: EMERGENCY MEDICINE

## 2025-03-03 PROCEDURE — 93000 ELECTROCARDIOGRAM COMPLETE: CPT | Performed by: EMERGENCY MEDICINE

## 2025-03-03 PROCEDURE — 99213 OFFICE O/P EST LOW 20 MIN: CPT | Performed by: EMERGENCY MEDICINE

## 2025-03-03 PROCEDURE — 3079F DIAST BP 80-89 MM HG: CPT | Performed by: EMERGENCY MEDICINE

## 2025-03-03 NOTE — TELEPHONE ENCOUNTER
Nurse Triage SBAR    Is this a 2nd Level Triage? NO    Situation: Pt calling to report SOB, unable to take a deep breath.    Background: Pt states that for the last week or so patient has been experiencing periods of SOB where she is unable to take a deep breath. Endorses that she is going through a stressful time currently; her brother is dying and she is going to go visit him soon.     Assessment:   1. RESPIRATORY STATUS: Pt states they are unable to take a deep breath  2. ONSET: It's been happening this last week, short periods of sob  3. PATTERN: Yes, it comes and goes  4. SEVERITY: Has been manageable until now, is unable to control breathing enough to allow a deep breath.   5. RECURRENT SYMPTOM: Not really  6. CARDIAC HISTORY: Denies  7. LUNG HISTORY: Denies  8. CAUSE: Anxiety  9. OTHER SYMPTOMS: Pt endorses chest tightness  10. O2 SATURATION MONITOR:  No  11. PREGNANCY: No  12. TRAVEL: No    Protocol Recommended Disposition:   No disposition on file.    Recommendation: Care advice given.  No available clinic appointments. RN recommending . Pt agreeable to plan.     Does the patient meet one of the following criteria for ADS visit consideration? No     Reason for Disposition   Patient wants to be seen    Additional Information   Negative: SEVERE difficulty breathing (e.g., struggling for each breath, speaks in single words, pulse > 120)   Negative: Breathing stopped and hasn't returned   Negative: Choking on something   Negative: Bluish (or gray) lips or face   Negative: Difficult to awaken or acting confused (e.g., disoriented, slurred speech)   Negative: Passed out (e.g., fainted, lost consciousness, blacked out and was not responding)   Negative: Wheezing started suddenly after medicine, an allergic food, or bee sting   Negative: Stridor (harsh sound while breathing in)   Negative: Slow, shallow and weak breathing   Negative: Sounds like a life-threatening emergency to the triager   Negative: Chest pain    "Negative: Wheezing (high pitched whistling sound) and previous asthma attacks or use of asthma medicines   Negative: Breathing difficulty and within 14 days of COVID-19 EXPOSURE (close contact) with someone diagnosed with COVID-19 (e.g., COVID test positive)   Negative: Breathing difficulty and COVID-19 is widespread in the community   Negative: Breathing diffculty and only present when coughing   Negative: Breathing difficulty and only from stuffy nose   Negative: Breathing diffculty and only from stuffy nose or runny nose from common cold   Negative: MODERATE difficulty breathing (e.g., speaks in phrases, SOB even at rest, pulse 100-120) of new-onset or worse than normal   Negative: Oxygen level (e.g., pulse oximetry) 90% or lower   Negative: Wheezing can be heard across the room   Negative: Drooling or spitting out saliva (because can't swallow)   Negative: Any history of prior \"blood clot\" in leg or lungs   Negative: Illness requiring prolonged bedrest in past month (e.g., immobilization, long hospital stay)   Negative: Hip or leg fracture (broken bone) in past month (or had cast on leg or ankle in past month)   Negative: Major surgery in the past month   Negative: Long-distance travel in past month (e.g., car, bus, train, plane; with trip lasting 6 or more hours)   Negative: Cancer treatment in past six months (or has cancer now)   Negative: Extra heartbeats, irregular heart beating, or heart is beating very fast (i.e., 'palpitations')   Negative: Fever > 103 F (39.4 C)   Negative: Fever > 101 F (38.3 C) and over 60 years of age   Negative: Fever > 100 F (37.8 C) and bedridden (e.g., nursing home patient, stroke, chronic illness, recovering from surgery)   Negative: Fever > 100 F (37.8 C) and diabetes mellitus or weak immune system (e.g., HIV positive, cancer chemo, splenectomy, organ transplant, chronic steroids)   Negative: Periods where breathing stops and then resumes normally and bedridden (e.g., nursing " home patient, CVA)   Negative: Pregnant or postpartum (from 0 to 6 weeks after delivery)   Negative: Patient sounds very sick or weak to the triager   Negative: MILD difficulty breathing (e.g., minimal/no SOB at rest, SOB with walking, pulse < 100) of new-onset or worse than normal   Negative: Longstanding difficulty breathing (e.g., CHF, COPD, emphysema) and worse than normal   Negative: Longstanding difficulty breathing and not responding to usual therapy   Negative: Continuous (nonstop) coughing    Protocols used: Breathing Difficulty-A-OH

## 2025-03-03 NOTE — PROGRESS NOTES
"Assessment & Plan     Diagnosis:    ICD-10-CM    1. Chest tightness  R07.89 EKG 12-lead complete w/read - Clinics      2. Anxiety attack  F41.0           Medical Decision Making:  Deya Lee is a 69 year old female who presents for evaluation of shortness of breath, chest tightness' and anxiety.  She is visiting her brother who has a very poor prognosis on Thursday and is nervous of this visit. There is a history of anxiety in the past and they are not on medications as she prefers not to. EKG is unremarkable here. She has no exertional chest pain and notes she exercised this morning and did not notice the symptoms. Vital signs notable for elevated blood pressure here and mild tachycardia. O2 is 100% on room air. There is no signs at this point of a general medical problem causing anxiety (PE, hyperthyroidism, other metabolic derangement, infection, etc).  There are no signs of serious decompensation warranting psychiatric evaluation.  She notes that her chest tightness has resolved after reassurance here. Supportive outpatient management is therefore indicated.  Patient verbalizes understanding and agreement with the plan including reasons to go to the ER. All questions answered.     Miki Chester PA-C  [facility]    Subjective     Deya Lee is a 69 year old female who presents to clinic today for the following health issues:  Chief Complaint   Patient presents with    Urgent Care    Breathing Problem     Pt reports at least for the last week have short period of time that \"can't take deep breathe\", today can't take deep breathe and chest feels tight-this been a couple hours now, brother is dying and is going to visit him in [place]       HPI  Patient states that she is always had some anxiety, has had periods of time where she feels short of breath, describes it as like she \"cannot take a deep breath\" and has chest \"tightness\" but not pain. She notes she feels like she is not " having difficulty breathing but it is hard to describe.  She does think that her anxiety may be playing some piece in this, but wants to make sure there is nothing secondary going on.  She does note that she has been very anxious about the political atmosphere in this country, and that her brother is dying and she is visiting him in [place] on Thursday.  She did have a surgery back in November on her great toe, since then has not had any issues with this and notes she was having these feelings of shortness of breath intermittently prior to this surgery.  She denies any foot or leg swelling, calf pain, history of DVT/PE, chest pain, lightheadedness, palpitations, fevers, cough or other URI symptoms.  She does see a therapist, is not on any medications that she would like to avoid these.    Review of Systems    See HPI    Objective      Vitals: BP (!) 146/88 (BP Location: Right arm, Patient Position: Sitting, Cuff Size: Adult Regular)   Pulse 102   Temp 97.9  F (36.6  C) (Oral)   Resp 18   Wt 70.1 kg (154 lb 9.6 oz)   SpO2 100%   BMI 24.95 kg/m        Patient Vitals for the past 24 hrs:   BP Temp Temp src Pulse Resp SpO2 Weight   03/03/25 1723 -- -- -- 102 18 -- --   03/03/25 1629 (!) 146/88 -- -- -- -- -- --   03/03/25 1624 (!) 171/85 97.9  F (36.6  C) Oral 114 14 100 % 70.1 kg (154 lb 9.6 oz)       Vital signs reviewed by: Miki Chester PA-C    Physical Exam   Constitutional: Patient is alert and cooperative. No acute distress.  Mouth: Mucous membranes are moist. Normal tongue and tonsil. Posterior oropharynx is clear.  Cardiovascular: Regular rate and rhythm. No murmurs, rubs or gallops.   Pulmonary/Chest: Lungs are clear to auscultation throughout. Effort normal. No respiratory distress. No wheezes, rales or rhonchi.  Neurological: Alert and oriented x3.   MSK/Skin: No lower extremity edema bilaterally from the knees down.  Psychiatric:The patient has an anxious affect. Pleasant.     EKG - Reviewed and  interpreted by: Miki Chester PA-C  Rate: 97 bpm   NC: 178 ms  QTc: 440 ms  Sinus rhythm with ectopic beat. Incomplete RBBB; unchanged from previous EKG. No acute ST/T changes c/w ischemia, no LVH by voltage criteria.    Miki Chester PA-C, March 3, 2025

## 2025-03-08 ENCOUNTER — HEALTH MAINTENANCE LETTER (OUTPATIENT)
Age: 70
End: 2025-03-08

## 2025-04-21 ENCOUNTER — MYC MEDICAL ADVICE (OUTPATIENT)
Dept: FAMILY MEDICINE | Facility: CLINIC | Age: 70
End: 2025-04-21

## 2025-04-21 ENCOUNTER — OFFICE VISIT (OUTPATIENT)
Dept: FAMILY MEDICINE | Facility: CLINIC | Age: 70
End: 2025-04-21
Payer: COMMERCIAL

## 2025-04-21 VITALS
WEIGHT: 156.4 LBS | RESPIRATION RATE: 16 BRPM | HEART RATE: 98 BPM | HEIGHT: 66 IN | OXYGEN SATURATION: 100 % | TEMPERATURE: 97.6 F | SYSTOLIC BLOOD PRESSURE: 138 MMHG | DIASTOLIC BLOOD PRESSURE: 71 MMHG | BODY MASS INDEX: 25.13 KG/M2

## 2025-04-21 DIAGNOSIS — M79.641 BILATERAL HAND PAIN: ICD-10-CM

## 2025-04-21 DIAGNOSIS — M79.7 FIBROMYALGIA AFFECTING MULTIPLE SITES: Primary | ICD-10-CM

## 2025-04-21 DIAGNOSIS — M79.642 BILATERAL HAND PAIN: ICD-10-CM

## 2025-04-21 PROCEDURE — 99214 OFFICE O/P EST MOD 30 MIN: CPT | Performed by: NURSE PRACTITIONER

## 2025-04-21 PROCEDURE — 3078F DIAST BP <80 MM HG: CPT | Performed by: NURSE PRACTITIONER

## 2025-04-21 PROCEDURE — 3075F SYST BP GE 130 - 139MM HG: CPT | Performed by: NURSE PRACTITIONER

## 2025-04-21 NOTE — PROGRESS NOTES
Assessment & Plan     1. Fibromyalgia affecting multiple sites (Primary)    Dx with fibromyalgia 6 years ago. Has had constant pain in elbows, hands, hips. Reports thorough workup through Rheu at [facility].  Gets by with tylenol and exercise.     Bilateral 2nd knuckles are more tender. Plays piano 30 minutes 3x/week.   Tx- compression gloves, helps.   FHX significant for RA- has tested negative in the past.    Patient is afebrile and in no acute distress with clear lung sounds.  Bilateral 2nd digit MCP with hypertrophy. No bogginess noted.    We discussed pharmacotherapy, patient hesitant to pursue today.  She is interested in seeing Rheu again, referral placed. Discussed OT for hand therapy, patient open to trying. Continue supportive measures.    - Adult Rheumatology  Referral; Future  - Occupational Therapy  Referral; Future    Follow-up if symptoms worsen/fail to improve.    All questions/concerns addressed. Patient stated understanding/agreement to plan of care.        Note: Chart documentation was done in part with Dragon Voice Recognition software.  Although reviewed after completion, some word and grammatical errors may remain. Please contact author for any clarification or concerns.                Jacob Falk is a 70 year old, presenting for the following health issues:  Hand Pain (Fibromyalgia bilateral hands) and Elbow Pain (Rt elbow pain)      4/21/2025     1:37 PM   Additional Questions   Roomed by jennifer gordon     History of Present Illness       Reason for visit:  Increased pain in hands    She eats 0-1 servings of fruits and vegetables daily.She consumes 0 sweetened beverage(s) daily.She exercises with enough effort to increase her heart rate 20 to 29 minutes per day.  She exercises with enough effort to increase her heart rate 5 days per week.   She is taking medications regularly.    Pain in bilateral hands- burning sensation when touched- very painful, unable to use mouse, and  write, plays piano. Would like to discuss recommendations on next step.    Rt elbow pain.  Hurts to straighten arm- pulling sensation in arm, lifting things- painful.       Dx with fibromyalgia 6 years ago. Has had constant pain in elbows, hands, hips.  Reports thorough workup through Rheu at [facility].  Patient is well-versed on tx for fibromyalgia, she is hesitant to pursue antidepressant/lyrica, etc.  Gets by with tylenol and exercise.     Bilateral 2nd knuckles are more tender. Plays piano 30 minutes 3x/week.   Tx- compression gloves, helps.   FHX significant for RA- has tested negative in the past.  No other acute concerns/symptoms at time of exam.      Review of Systems   Constitutional, HEENT, cardiovascular, pulmonary, gi and gu systems are negative, except as otherwise noted.      Current Outpatient Medications   Medication Sig Dispense Refill    acetaminophen (TYLENOL) 500 MG tablet Take 1,000 mg by mouth every 6 hours as needed for mild pain ([id number]).      carboxymethylcellulose PF (REFRESH PLUS) 0.5 % ophthalmic solution Place 1 drop into both eyes daily as needed for dry eyes.      cetirizine (ZYRTEC) 10 MG tablet Take 10 mg by mouth every morning.      fluticasone (FLONASE) 50 MCG/ACT nasal spray Spray 1 spray into both nostrils daily.      naphazoline-pheniramine (OPCON-A) SOLN ophthalmic solution Place 1 drop into both eyes as needed for irritation.      psyllium (METAMUCIL) 58.6 % packet Take 1 packet by mouth 2 times daily.      sodium chloride (OCEAN) 0.65 % nasal spray Spray 1 spray into both nostrils daily as needed for congestion.       No current facility-administered medications for this visit.         Past Medical History:   Diagnosis Date    Arthritis 1990    Knees, hips    History of blood transfusion 1978    Childbirth    History of depression     Personal history of kidney stones 10/04/2012    Seasonal allergies 10/04/2012       Past Surgical History:   Procedure Laterality Date     "ARTHRODESIS FOOT Right 11/26/2024    Procedure: first metatarsophalangeal joint arthrodesis right foot;  Surgeon: Jenny Julio DPM;  Location: [facility] OR    BUNIONECTOMY      right foot    C/SECTION, CLASSICAL      2 times    COLONOSCOPY      COLONOSCOPY N/A 03/26/2019    Procedure: Combined Colonoscopy, Single Or Multiple Biopsy/Polypectomy By Biopsy;  Surgeon: Jose L Leigh MD;  Location: [facility] OR    COLONOSCOPY WITH CO2 INSUFFLATION N/A 03/26/2019    Procedure: COLONOSCOPY WITH CO2 INSUFFLATION;  Surgeon: Jose L Leigh MD;  Location: [facility] OR    FUSION CERVICAL ANTERIOR ONE LEVEL      c4-5    HEAD & NECK SURGERY  2011    Fusion C4-C5    SINUS SURGERY      deviated septum correction    TONSILLECTOMY & ADENOIDECTOMY         Family History   Problem Relation Age of Onset    Heart Disease Father         MI in his 70's    Cancer Father         had kidney removed due to spot     Diabetes Paternal Grandmother     Diabetes Paternal Grandfather     Alzheimer Disease Sister 52    C.A.D. No family hx of     Breast Cancer No family hx of     Colon Cancer No family hx of        Social History     Tobacco Use    Smoking status: Never     Passive exposure: Never    Smokeless tobacco: Never   Substance Use Topics    Alcohol use: No             Objective    /71 (BP Location: Right arm, Patient Position: Sitting, Cuff Size: Adult Regular)   Pulse 98   Temp 97.6  F (36.4  C) (Temporal)   Resp 16   Ht 1.676 m (5' 6\")   Wt 70.9 kg (156 lb 6.4 oz)   SpO2 100%   BMI 25.24 kg/m    Body mass index is 25.24 kg/m .  Physical Exam  Constitutional:       General: She is not in acute distress.     Appearance: She is not ill-appearing.   Cardiovascular:      Rate and Rhythm: Normal rate and regular rhythm.      Heart sounds: Normal heart sounds.   Pulmonary:      Effort: No respiratory distress.      Breath sounds: Normal breath sounds. No wheezing or rales.   Musculoskeletal:      Cervical back: Neck supple.      " Comments: Bilateral 2nd digit MCP with hypertrophy. No bogginess noted.   Lymphadenopathy:      Cervical: No cervical adenopathy.   Neurological:      General: No focal deficit present.      Mental Status: She is alert.   Psychiatric:         Behavior: Behavior normal.                Signed Electronically by: ALISA Fisher CNP  I spent a total of 32 minutes on the day of the visit. Time spent by me includes doing chart review, history and exam, documentation and further activities per the note

## 2025-04-21 NOTE — PATIENT INSTRUCTIONS
Treatments we discussed:  - SNRI (duloxetine or venlafaxine). I would recommend trying duloxetine first.   - Lyrica or gabapentin

## 2025-04-23 RX ORDER — DULOXETIN HYDROCHLORIDE 30 MG/1
30 CAPSULE, DELAYED RELEASE ORAL DAILY
Qty: 60 CAPSULE | Refills: 1 | Status: SHIPPED | OUTPATIENT
Start: 2025-04-23

## 2025-04-28 ENCOUNTER — TRANSFERRED RECORDS (OUTPATIENT)
Dept: HEALTH INFORMATION MANAGEMENT | Facility: CLINIC | Age: 70
End: 2025-04-28
Payer: COMMERCIAL

## 2025-04-28 ENCOUNTER — PATIENT OUTREACH (OUTPATIENT)
Dept: CARE COORDINATION | Facility: CLINIC | Age: 70
End: 2025-04-28
Payer: COMMERCIAL

## 2025-05-23 ENCOUNTER — ANCILLARY PROCEDURE (OUTPATIENT)
Dept: GENERAL RADIOLOGY | Facility: CLINIC | Age: 70
End: 2025-05-23
Attending: NURSE PRACTITIONER
Payer: COMMERCIAL

## 2025-05-23 DIAGNOSIS — M79.642 BILATERAL HAND PAIN: ICD-10-CM

## 2025-05-23 DIAGNOSIS — M79.641 BILATERAL HAND PAIN: ICD-10-CM

## 2025-05-28 ENCOUNTER — RESULTS FOLLOW-UP (OUTPATIENT)
Dept: PALLIATIVE MEDICINE | Facility: CLINIC | Age: 70
End: 2025-05-28

## 2025-06-03 ENCOUNTER — TELEPHONE (OUTPATIENT)
Dept: GASTROENTEROLOGY | Facility: CLINIC | Age: 70
End: 2025-06-03
Payer: COMMERCIAL

## 2025-06-03 NOTE — TELEPHONE ENCOUNTER
"Endoscopy Scheduling Screen    Caller: patient    Have you had any respiratory illness or flu-like symptoms in the last 10 days?  No    What is your communication preference for Instructions and/or Bowel Prep?   Sushilat    What insurance is in the chart?  Other:  /MEDICARE    Ordering/Referring Provider:   LAURA BREWSTER        (If ordering provider performs procedure, schedule with ordering provider unless otherwise instructed. )    BMI: Estimated body mass index is 25.24 kg/m  as calculated from the following:    Height as of 4/21/25: 1.676 m (5' 6\").    Weight as of 4/21/25: 70.9 kg (156 lb 6.4 oz).     Sedation Ordered  moderate sedation.   If patient BMI > 50 do not schedule in [facility].    If patient BMI > 45 do not schedule at [facility].    Are you taking methadone or Suboxone?  NO, No RN review required.    Have you been diagnosed and are being treated for severe PTSD or severe anxiety?  NO, No RN review required.    Are you taking any prescription medications for pain 3 or more times per week?   NO, No RN review required.    Do you have a history of malignant hyperthermia?  No    (Females) Are you currently pregnant?   No     Have you been diagnosed or told you have pulmonary hypertension?   No    Do you have an LVAD?  No    Have you been told you have moderate to severe sleep apnea?  No.    Have you been told you have COPD, asthma, or any other lung disease?  No    Has your doctor ordered any cardiac tests like echo, angiogram, stress test, ablation, or EKG, that you have not completed yet?  No    Do you  have a history of any heart conditions?  No     Have you ever had or are you waiting for an organ transplant?  No. Continue scheduling, no site restrictions.    Have you had a stroke or transient ischemic attack (TIA aka \"mini stroke\") in the last 2 years?   No.    Have you been diagnosed with or been told you have cirrhosis of the liver?   No.    Are you currently on dialysis?   No    Do you need assistance " "transferring?   No    BMI: Estimated body mass index is 25.24 kg/m  as calculated from the following:    Height as of 4/21/25: 1.676 m (5' 6\").    Weight as of 4/21/25: 70.9 kg (156 lb 6.4 oz).     Is patients BMI > 40 and scheduling location UP?  No    Do you take an injectable or oral medication for weight loss or diabetes (excluding insulin)?  No    Do you take the medication Naltrexone?  No    Do you take blood thinners?  No       Prep   Are you currently on dialysis or do you have chronic kidney disease?  No    Do you have a diagnosis of diabetes?  No    Do you have a diagnosis of cystic fibrosis (CF)?  No    On a regular basis do you go 3 -5 days between bowel movements?  No    BMI > 40?  No    Preferred Pharmacy:    [id number] - [address] AT CORNER OF [address] [place] [id number]  Phone: [phone] Fax: [phone]    Final Scheduling Details     Procedure scheduled  Colonoscopy    Surgeon:  MORENITA     Date of procedure:  08/20/2025     Pre-OP / PAC:   No - Not required for this site.    Location  [facility] - Patient preference.    Sedation   MAC/Deep Sedation REDUNDANT COLON      Patient Reminders:   You will receive a call from a Nurse to review instructions and health history.  This assessment must be completed prior to your procedure.  Failure to complete the Nurse assessment may result in the procedure being cancelled.      On the day of your procedure, please designate an adult(s) who can drive you home stay with you for the next 24 hours. The medicines used in the exam will make you sleepy. You will not be able to drive.      You cannot take public transportation, ride share services, or non-medical taxi service without a responsible caregiver.  Medical transport services are allowed with the requirement that a responsible caregiver will receive you at your destination.  We require that drivers and caregivers are confirmed prior to your procedure.  "

## 2025-06-09 ENCOUNTER — TELEPHONE (OUTPATIENT)
Dept: FAMILY MEDICINE | Facility: CLINIC | Age: 70
End: 2025-06-09
Payer: COMMERCIAL

## 2025-06-09 NOTE — TELEPHONE ENCOUNTER
Patient Quality Outreach    Patient is due for the following:   Breast Cancer Screening - Mammogram  Physical Annual Wellness Visit    Action(s) Taken:   Patient declined follow up at this time.    Type of outreach:    Phone, spoke to patient/parent. She is not interested in having a visit for AWV    Questions for provider review:    None         Deloris Chan MA  Chart routed to None.

## 2025-06-12 ENCOUNTER — THERAPY VISIT (OUTPATIENT)
Dept: PHYSICAL THERAPY | Facility: CLINIC | Age: 70
End: 2025-06-12
Attending: NURSE PRACTITIONER
Payer: COMMERCIAL

## 2025-06-12 DIAGNOSIS — M79.641 BILATERAL HAND PAIN: ICD-10-CM

## 2025-06-12 DIAGNOSIS — M79.7 FIBROMYALGIA AFFECTING MULTIPLE SITES: ICD-10-CM

## 2025-06-12 DIAGNOSIS — M79.642 BILATERAL HAND PAIN: ICD-10-CM

## 2025-06-12 NOTE — PROGRESS NOTES
PHYSICAL THERAPY EVALUATION  Type of Visit: Evaluation          Fall Risk Screen:  Have you fallen 2 or more times in the past year?: No  Have you fallen and had an injury in the past year?: No  Is patient receiving Physical Therapy Services?: No    Subjective      Condition type:  Chronic (continuous duration <3 months)  Cause of current episode:  Unspecified     Nature of treatment:  Rehabilitative  Functional ability:  Moderate functional limitations  Documented POC (choose all that apply):  Measurable short and long term/discharge treatment goals related to physical and functional deficits.;Frequency of treatment visits and treatment activities to address deficit areas.;Patient agrees to program participation including home program  Briefly describe symptoms:  widespread diffuse pain  How did the symptoms start:  insidious  Average pain/intensity last 24 hours:  6/10  Average pain/intensity past week:  6/10  Frequency of Symptoms:  Constantly (% of the time)  Symptom impact on ADLs:  Quite a bit  Condition change since eval:  N/A (first visit)  General health reported by patient:  Fair      Presenting condition or subjective complaint: Pain in back - manifests as muscle spasms  Date of onset: 05/23/25    Relevant medical history: Arthritis; Fibromyalgia   Dates & types of surgery: Big toe fusion November 2024    Prior diagnostic imaging/testing results: X-ray; Bone scan     Prior therapy history for the same diagnosis, illness or injury: Yes PT    Deya Lee is a 70 year old female with history of arthritis, depression, C4-C5 fusion, and fibromyalgia.    Was diagnosed with fibromyalgia six years ago.  Reports increased bilateral hand pain; needs to wear compression gloves to play piano, work on computer, drive, etc.  Has tried several medications to manage pain; has significant side effects with everything attempted.   Seeking non-medication options.    Wants to find out if hand pain is arthritis  "or other cause.   Poor sleep; \"I sleep in 2-3 hour shifts\"; always awake by 3am;   Reports dry eyes, dry nose, dry mouth.   Very stiff when getting out of bed; hip pain with stiffness.   Needs to get up and move every 30 minutes or pain/stiffness will increase significantly.   History of LBP with IBS.   Feels mid to lower back muscle spasms.     Living Environment  Social support: Alone   Type of home: Apartment/condo   Stairs to enter the home: No       Ramp: No   Stairs inside the home: No       Help at home: None  Equipment owned:       Employment: No    Hobbies/Interests: Playing [employer] 4 times a week, needlework, read, play games on computer, spend time with [place]    Patient goals for therapy: Be able to stand and walk without back muscles tightening, decrease fatigue, increase endurance, stand longer.     Objective   EVALUATION  POSTURE: Sitting Posture: Rounded shoulders, Forward head, hyperactive [place] B  GAIT:   Assistive Device(s): None  Gait Deviations: Stride length decreased  Decreased arm swing, trunk rotation  BALANCE/PROPRIOCEPTION: Single Leg Stance Eyes Open (seconds): 5\" R and L  ROM: neck rotation 75% R and L, flex 75%, ext 25%.  Lumbar flex 75%, ext 25%.Able to make a soft fist but painful and able to extend/splay fingers with less pain than fist (finger flexion)  STRENGTH: able to heel walk, toe walk.    Assessment & Plan   CLINICAL IMPRESSIONS  Medical Diagnosis: Fibromyalgia, B hand pain    Treatment Diagnosis: Fibromyalgia, B hand pain   Impression/Assessment: Patient is a 70 year old female with fibromyalgia, B hand pain complaints.  The following significant findings have been identified: Pain, Decreased ROM/flexibility, Decreased strength, Impaired gait, Impaired muscle performance, Decreased activity tolerance, and Impaired posture. These impairments interfere with their ability to perform self care tasks, recreational activities, household chores, and community mobility as " compared to previous level of function.     Clinical Decision Making (Complexity):  Clinical Presentation: Evolving/Changing  Clinical Presentation Rationale: based on medical and personal factors listed in PT evaluation  Clinical Decision Making (Complexity): Moderate complexity    PLAN OF CARE  Treatment Interventions:  Interventions: Manual Therapy, Neuromuscular Re-education, Therapeutic Activity, Therapeutic Exercise, Self-Care/Home Management    Long Term Goals     PT Goal 1  Goal Identifier: standing  Goal Description: Able to consistently stand for 25 minutes  Rationale: to maximize safety and independence with performance of ADLs and functional tasks;to maximize safety and independence within the home;to maximize safety and independence within the community;to maximize safety and independence with self cares  Target Date: 08/12/25  PT Goal 2  Goal Identifier: Fatigue  Goal Description: Improve FACIT score to 39 (initial score 27)  Rationale: to maximize safety and independence with performance of ADLs and functional tasks;to maximize safety and independence within the home;to maximize safety and independence within the community;to maximize safety and independence with transportation;to maximize safety and independence with self cares  Target Date: 10/12/25      Frequency of Treatment: 1x wk x 8 wks, 2x month x 2 months  Duration of Treatment: 4 months    Education Assessment:        Risks and benefits of evaluation/treatment have been explained.   Patient/Family/caregiver agrees with Plan of Care.     Evaluation Time:     PT Eval, Moderate Complexity Minutes (15269): 35     Signing Clinician: Britton Walsh PT        [facility]                                                                                   OUTPATIENT PHYSICAL THERAPY      PLAN OF TREATMENT FOR OUTPATIENT REHABILITATION   Patient's Last Name, First Name, Deya Gorman YOB: 1955    Provider's Name   [facility]   Medical Record No.  [id number]     Onset Date: 05/23/25  Start of Care Date: 06/12/25     Medical Diagnosis:  Fibromyalgia, B hand pain      PT Treatment Diagnosis:  Fibromyalgia, B hand pain Plan of Treatment  Frequency/Duration: 1x wk x 8 wks, 2x month x 2 months/ 4 months    Certification date from 06/12/25 to 09/09/25         See note for plan of treatment details and functional goals     Britton Walsh, PT                         I CERTIFY THE NEED FOR THESE SERVICES FURNISHED UNDER        THIS PLAN OF TREATMENT AND WHILE UNDER MY CARE     (Physician attestation of this document indicates review and certification of the therapy plan).              Referring Provider:  Yaquelin Lal    Initial Assessment  See Epic Evaluation- Start of Care Date: 06/12/25

## 2025-06-15 ENCOUNTER — MYC MEDICAL ADVICE (OUTPATIENT)
Dept: PHYSICAL THERAPY | Facility: CLINIC | Age: 70
End: 2025-06-15
Payer: COMMERCIAL

## 2025-07-13 ENCOUNTER — HEALTH MAINTENANCE LETTER (OUTPATIENT)
Age: 70
End: 2025-07-13

## (undated) DEVICE — PREP CHLORAPREP 26ML TINTED ORANGE  260815

## (undated) DEVICE — CAST PADDING 4" STERILE 9044S

## (undated) DEVICE — CAST PADDING 4" UNSTERILE 9044

## (undated) DEVICE — SU ETHILON 3-0 FS-1 18" 663G

## (undated) DEVICE — PREP DURAPREP 26ML APL 8630

## (undated) DEVICE — REAMER ARTHREX METATARSAL 20MM AR-8944MR-20

## (undated) DEVICE — GLOVE BIOGEL PI MICRO INDICATOR UNDERGLOVE SZ 6.5 48965

## (undated) DEVICE — PAD ABD 10X8IN DERMACEA ABS NWVN 4 SL EDG SFT LF STRL 7198D

## (undated) DEVICE — GUIDE WIRE ARTHREX W/TROCAR TIP .062" AR-8941K

## (undated) DEVICE — Device

## (undated) DEVICE — DRSG GAUZE 4X4" 3033

## (undated) DEVICE — BNDG ELASTIC 4"X5YDS STERILE 6611-4S

## (undated) DEVICE — BLADE SAW OSCILLATING STRYK MED 9.0X25X0.38MM 2296-003-111

## (undated) DEVICE — SOL NACL 0.9% IRRIG 1000ML BOTTLE 2F7124

## (undated) DEVICE — LINEN TOWEL PACK X5 5464

## (undated) DEVICE — BNDG ELASTIC 4"X5YDS UNSTERILE 6611-40

## (undated) DEVICE — DECANTER BAG 2002S

## (undated) DEVICE — SU VICRYL 3-0 PS-1 18" UND J683

## (undated) DEVICE — DRAPE MINI C-ARM 4003

## (undated) DEVICE — REAMER ARTHREX PHALANGEAL 20MM AR-8944PR-20

## (undated) DEVICE — PACK EXTREMITY SOP15EXFSD

## (undated) DEVICE — DRSG KERLIX 4 1/2"X4YDS ROLL 6715

## (undated) DEVICE — DRILL BIT ARTHREX MTP 2.0MM AR-8944-22

## (undated) DEVICE — SOL WATER IRRIG 1000ML BOTTLE 2F7114

## (undated) DEVICE — BLADE KNIFE SURG 15 371115

## (undated) DEVICE — SU MONOCRYL 4-0 PS-2 18" UND Y496G

## (undated) DEVICE — SOL WATER IRRIG 1000ML BOTTLE 07139-09

## (undated) DEVICE — GLOVE BIOGEL PI SZ 6.5 40865

## (undated) RX ORDER — HYDROMORPHONE HYDROCHLORIDE 1 MG/ML
INJECTION, SOLUTION INTRAMUSCULAR; INTRAVENOUS; SUBCUTANEOUS
Status: DISPENSED
Start: 2024-11-26

## (undated) RX ORDER — SIMETHICONE 40MG/0.6ML
SUSPENSION, DROPS(FINAL DOSAGE FORM)(ML) ORAL
Status: DISPENSED
Start: 2019-03-26

## (undated) RX ORDER — CEFAZOLIN SODIUM/WATER 2 G/20 ML
SYRINGE (ML) INTRAVENOUS
Status: DISPENSED
Start: 2024-11-26

## (undated) RX ORDER — OXYCODONE HYDROCHLORIDE 5 MG/1
TABLET ORAL
Status: DISPENSED
Start: 2024-11-26

## (undated) RX ORDER — FENTANYL CITRATE 50 UG/ML
INJECTION, SOLUTION INTRAMUSCULAR; INTRAVENOUS
Status: DISPENSED
Start: 2024-11-26

## (undated) RX ORDER — PROPOFOL 10 MG/ML
INJECTION, EMULSION INTRAVENOUS
Status: DISPENSED
Start: 2024-11-26

## (undated) RX ORDER — FENTANYL CITRATE 50 UG/ML
INJECTION, SOLUTION INTRAMUSCULAR; INTRAVENOUS
Status: DISPENSED
Start: 2019-03-26